# Patient Record
Sex: FEMALE | Race: WHITE | NOT HISPANIC OR LATINO | Employment: UNEMPLOYED | URBAN - METROPOLITAN AREA
[De-identification: names, ages, dates, MRNs, and addresses within clinical notes are randomized per-mention and may not be internally consistent; named-entity substitution may affect disease eponyms.]

---

## 2023-07-13 PROBLEM — Z98.891 HX OF CESAREAN SECTION: Status: ACTIVE | Noted: 2023-07-13

## 2023-07-25 ENCOUNTER — OFFICE VISIT (OUTPATIENT)
Dept: OBSTETRICS AND GYNECOLOGY | Facility: HOSPITAL | Age: 33
End: 2023-07-25
Payer: COMMERCIAL

## 2023-07-25 ENCOUNTER — HOSPITAL ENCOUNTER (OUTPATIENT)
Dept: WOMENS IMAGING | Facility: HOSPITAL | Age: 33
Discharge: HOME | End: 2023-07-25
Admitting: MIDWIFE
Payer: COMMERCIAL

## 2023-07-25 VITALS
WEIGHT: 236 LBS | SYSTOLIC BLOOD PRESSURE: 120 MMHG | HEIGHT: 65 IN | BODY MASS INDEX: 39.32 KG/M2 | DIASTOLIC BLOOD PRESSURE: 75 MMHG

## 2023-07-25 DIAGNOSIS — Z34.82 ENCOUNTER FOR SUPERVISION OF OTHER NORMAL PREGNANCY IN SECOND TRIMESTER: ICD-10-CM

## 2023-07-25 DIAGNOSIS — D56.3 CARRIER OF BETA THALASSEMIA: Primary | ICD-10-CM

## 2023-07-25 DIAGNOSIS — O34.219 PREVIOUS CESAREAN DELIVERY AFFECTING PREGNANCY: ICD-10-CM

## 2023-07-25 DIAGNOSIS — Z34.90 PREGNANCY, UNSPECIFIED GESTATIONAL AGE: ICD-10-CM

## 2023-07-25 PROCEDURE — 99212 OFFICE O/P EST SF 10 MIN: CPT | Performed by: OBSTETRICS & GYNECOLOGY

## 2023-07-25 PROCEDURE — 76811 OB US DETAILED SNGL FETUS: CPT

## 2023-07-25 NOTE — ASSESSMENT & PLAN NOTE
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  She has had no significant complications.  She reports no need for transfusions and has not reported any concerns regarding her hematocrit or hemoglobin.  The only symptom she notes is some cold intolerance.    Beta thalassemia is generally inherited as a autosomal recessive.  Father this child has not been tested but has no symptoms of thalassemia.  Recommended the father be tested for thalassemia.  If he is a carrier then this fetus has a 1 and forts risk of thalassemia major.  If he is not a carrier then the child has a 50-50 chance of being a carrier for thalassemia.    Patient is already tolerating 1 pregnancy without significant complications requiring no alterations with normal sed rate care.  I would recommend CBCs approximately every 2 months.  When the patient becomes anemic and iron studies would be appropriate and therapy as indicated.    We recommend the patient return in 6 weeks time for us to complete an anatomic survey and reassess fetal growth.

## 2023-07-25 NOTE — PROGRESS NOTES
"Documentation of the ultrasound findings, images, and interpretations will be available in the patient's Viewpoint report which is located in the imaging tab in chart review.        Maternal/Fetal Medicine Consult Note     Name: Sayda Tinsley    : 1990     MRN: [id number]     Referring Provider: Jodie Faulkner CNM    Chief Complaint  low lying placenta    Subjective     History of Present Illness:  Sayda Tinsley is a 33 y.o.  22w4d who presents today for beta thalassemia    GLO: Estimated Date of Delivery: 23     ROS:   As noted in HPI.     Past Medical History:   Diagnosis Date    Anemia     Anxiety     Asthma     Hypertension     PMS (premenstrual syndrome)       Past Surgical History:   Procedure Laterality Date     SECTION  21    EAR TUBES      WISDOM TOOTH EXTRACTION        OB History          3    Para   1    Term   1       0    AB   1    Living   1         SAB   0    IAB   1    Ectopic   0    Molar   0    Multiple   0    Live Births   1          Obstetric Comments   FOB #1 Pregnancy #1  IAB at 7 weeks  FOB #2 Pregnancy #2 P C/S at 38 weeks, female, GHTN  FOB #2 Pregnancy #3  current               Objective     Vital Signs  /75   Ht 165.1 cm (65\")   Wt 107 kg (236 lb)   Estimated body mass index is 39.27 kg/m² as calculated from the following:    Height as of this encounter: 165.1 cm (65\").    Weight as of this encounter: 107 kg (236 lb).    Physical Exam    Ultrasound Impression:   See viewpoint    Assessment and Plan     Sayda Tinsley is a 33 y.o.  22w4d who presents today for beta thalassemia.    Diagnoses and all orders for this visit:    1. Carrier of beta thalassemia (Primary)  Assessment & Plan:  Patient diagnosed with beta thalassemia minor as a child.  She has had no significant complications.  She reports no need for transfusions and has not reported any concerns regarding her hematocrit or hemoglobin.  The only symptom " she notes is some cold intolerance.    Beta thalassemia is generally inherited as a autosomal recessive.  Father this child has not been tested but has no symptoms of thalassemia.  Recommended the father be tested for thalassemia.  If he is a carrier then this fetus has a 1 and forts risk of thalassemia major.  If he is not a carrier then the child has a 50-50 chance of being a carrier for thalassemia.    Patient is already tolerating 1 pregnancy without significant complications requiring no alterations with normal sed rate care.  I would recommend CBCs approximately every 2 months.  When the patient becomes anemic and iron studies would be appropriate and therapy as indicated.    We recommend the patient return in 6 weeks time for us to complete an anatomic survey and reassess fetal growth.      2. Pregnancy, unspecified gestational age    3. Previous  delivery affecting pregnancy         Follow Up  Return in about 6 weeks (around 2023).    I spent 20 minutes caring for the patient on the day of service. This included: obtaining or reviewing a separately obtained medical history, reviewing patient records, performing a medically appropriate exam and/or evaluation, counseling or educating the patient/family/caregiver, ordering medications, labs, and/or procedures and documenting such in the medical record. This does not include time spent on review and interpretation of other tests such as fetal ultrasound or the performance of other procedures such as amniocentesis or CVS.      Douglas A. Milligan, MD  Maternal Fetal Medicine, [facility]     2023

## 2023-07-25 NOTE — LETTER
July 25, 2023     Jodie Faulkner CNM  [address] [facility]  [address] [place] [id number]    Patient: Sayda Tinsley   YOB: 1990   Date of Visit: 7/25/2023       Dear Jodie Faulkner CNM:    Thank you for referring Sayda Tinsley to me for evaluation. Below are the relevant portions of my assessment and plan of care.    Encounter Diagnosis and Orders:       If you have questions, please do not hesitate to call me. I look forward to following Sayda along with you.         Sincerely,        Douglas A. Milligan, MD        CC: No Recipients

## 2023-07-25 NOTE — PROGRESS NOTES
Patient denies bleeding, leaking fluid or cramping  NIPT low risk  Patients next follow up with Dr. Veliz office is 8/14/2023

## 2023-08-14 ENCOUNTER — ROUTINE PRENATAL (OUTPATIENT)
Dept: OBSTETRICS AND GYNECOLOGY | Facility: CLINIC | Age: 33
End: 2023-08-14
Payer: COMMERCIAL

## 2023-08-14 VITALS — SYSTOLIC BLOOD PRESSURE: 122 MMHG | BODY MASS INDEX: 40.1 KG/M2 | WEIGHT: 241 LBS | DIASTOLIC BLOOD PRESSURE: 68 MMHG

## 2023-08-14 DIAGNOSIS — Z98.891 HX OF CESAREAN SECTION: Primary | ICD-10-CM

## 2023-08-14 DIAGNOSIS — D56.3 CARRIER OF BETA THALASSEMIA: ICD-10-CM

## 2023-08-14 PROCEDURE — 0502F SUBSEQUENT PRENATAL CARE: CPT | Performed by: MIDWIFE

## 2023-08-14 NOTE — PROGRESS NOTES
Chief Complaint   Patient presents with    Routine Prenatal Visit     No Complaints/concerns        HPI: Sayda is a  currently at 25w3d here for prenatal visit who reports the following:  Baby is active. She has F/U with [facility] for heart views . She denies any problems.                EXAM:     Vitals:    23 1021   BP: 122/68      Abdomen:   See prenatal flowsheet as noted and reviewed, soft, nontender   Pelvic:  See prenatal flowsheet as noted and reviewed   Urine:  See prenatal flowsheet as noted and reviewed    Lab Results   Component Value Date    ABO O 2023    RH Positive 2023    ABSCRN Normal 2023       MDM:  Impression: Supervision of low risk pregnancy  Previous C/S with planned repeat C/S  + Thalassemia   Tests done today: UA for P/C ratio   Topics discussed: kick counts and fetal movement  low-lying placenta/previa precautions  Reviewed OB labs   Tests next visit: GCT  HgB                RTO:                        2 weeks    This note was electronically signed.  Jodie Faulkner, APRN  2023

## 2023-08-15 LAB
CREAT UR-MCNC: 47.4 MG/DL
PROT UR-MCNC: 6.3 MG/DL
PROT/CREAT UR: 132.9 MG/G CREA (ref 0–200)

## 2023-08-28 ENCOUNTER — ROUTINE PRENATAL (OUTPATIENT)
Dept: OBSTETRICS AND GYNECOLOGY | Facility: CLINIC | Age: 33
End: 2023-08-28
Payer: COMMERCIAL

## 2023-08-28 VITALS — WEIGHT: 241 LBS | SYSTOLIC BLOOD PRESSURE: 114 MMHG | BODY MASS INDEX: 40.1 KG/M2 | DIASTOLIC BLOOD PRESSURE: 72 MMHG

## 2023-08-28 DIAGNOSIS — D56.3 CARRIER OF BETA THALASSEMIA: ICD-10-CM

## 2023-08-28 DIAGNOSIS — Z98.891 HX OF CESAREAN SECTION: ICD-10-CM

## 2023-08-28 DIAGNOSIS — Z30.2 REQUEST FOR STERILIZATION: ICD-10-CM

## 2023-08-28 DIAGNOSIS — Z13.1 DIABETES MELLITUS SCREENING: ICD-10-CM

## 2023-08-28 DIAGNOSIS — Z34.92 PRENATAL CARE IN SECOND TRIMESTER: Primary | ICD-10-CM

## 2023-08-28 PROBLEM — O34.219 PREVIOUS CESAREAN DELIVERY AFFECTING PREGNANCY: Status: RESOLVED | Noted: 2023-07-25 | Resolved: 2023-08-28

## 2023-08-28 PROBLEM — Z34.90 PREGNANCY: Status: RESOLVED | Noted: 2023-07-25 | Resolved: 2023-08-28

## 2023-08-28 LAB
BASOPHILS # BLD AUTO: 0.03 10*3/MM3 (ref 0–0.2)
BASOPHILS NFR BLD AUTO: 0.3 % (ref 0–1.5)
EOSINOPHIL # BLD AUTO: 0.06 10*3/MM3 (ref 0–0.4)
EOSINOPHIL NFR BLD AUTO: 0.7 % (ref 0.3–6.2)
ERYTHROCYTE [DISTWIDTH] IN BLOOD BY AUTOMATED COUNT: 15.7 % (ref 12.3–15.4)
GLUCOSE 1H P 50 G GLC PO SERPL-MCNC: 110 MG/DL (ref 65–139)
HCT VFR BLD AUTO: 30.7 % (ref 34–46.6)
HGB BLD-MCNC: 9.9 G/DL (ref 12–15.9)
IMM GRANULOCYTES # BLD AUTO: 0.04 10*3/MM3 (ref 0–0.05)
IMM GRANULOCYTES NFR BLD AUTO: 0.5 % (ref 0–0.5)
LYMPHOCYTES # BLD AUTO: 1.68 10*3/MM3 (ref 0.7–3.1)
LYMPHOCYTES NFR BLD AUTO: 19.2 % (ref 19.6–45.3)
MCH RBC QN AUTO: 21.1 PG (ref 26.6–33)
MCHC RBC AUTO-ENTMCNC: 32.2 G/DL (ref 31.5–35.7)
MCV RBC AUTO: 65.5 FL (ref 79–97)
MONOCYTES # BLD AUTO: 0.44 10*3/MM3 (ref 0.1–0.9)
MONOCYTES NFR BLD AUTO: 5 % (ref 5–12)
NEUTROPHILS # BLD AUTO: 6.49 10*3/MM3 (ref 1.7–7)
NEUTROPHILS NFR BLD AUTO: 74.3 % (ref 42.7–76)
PLATELET # BLD AUTO: 198 10*3/MM3 (ref 140–450)
RBC # BLD AUTO: 4.69 10*6/MM3 (ref 3.77–5.28)
WBC # BLD AUTO: 8.74 10*3/MM3 (ref 3.4–10.8)

## 2023-08-28 PROCEDURE — 0502F SUBSEQUENT PRENATAL CARE: CPT | Performed by: OBSTETRICS & GYNECOLOGY

## 2023-08-28 NOTE — PROGRESS NOTES
Prenatal Care Visit    Subjective   Chief Complaint   Patient presents with    Routine Prenatal Visit     Mago, no complaints.       History:   Sayda is a  currently at 27w3d who presents for a prenatal care visit today.    No issues.    Social History    Tobacco Use      Smoking status: Former        Packs/day: 0.25        Years: 15.00        Pack years: 3.75        Types: Cigarettes        Quit date: 3/17/2023        Years since quittin.4      Smokeless tobacco: Not on file      Tobacco comments: Nor sure on start date       Objective   /72   Wt 109 kg (241 lb)   BMI 40.10 kg/mý   Physical Exam:  Normal, gestational age-appropriate exam today        Plan   Medical Decision Making:    I have reviewed the prenatal labs and ultrasound(s) today. I have reviewed the most recent prenatal progress note(s).    Diagnosis: Supervision of high risk pregnancy  Previous C/S with planned repeat C/S  Beta Thal minor, partner NEG  H/O gHTN  Desires sterilization   Tests/Orders/Rx today: Orders Placed This Encounter   Procedures    Gestational Screen 1 Hr ([facility])     Order Specific Question:   Release to patient     Answer:   Routine Release [[id number]]    CBC & Differential     Order Specific Question:   Manual Differential     Answer:   No     Order Specific Question:   Release to patient     Answer:   Routine Release [[id number]]       Medication Management: None     Topics discussed: Prenatal care milestones   section risks, benefits  tubal risks, benefits   Tests next visit: none   Next visit: 3 week(s)     Brody Chakraborty MD  Obstetrics and Gynecology  [facility]

## 2023-08-29 RX ORDER — FERROUS SULFATE 325(65) MG
325 TABLET ORAL
Qty: 60 TABLET | Refills: 6 | Status: SHIPPED | OUTPATIENT
Start: 2023-08-29

## 2023-09-05 ENCOUNTER — HOSPITAL ENCOUNTER (OUTPATIENT)
Dept: WOMENS IMAGING | Facility: HOSPITAL | Age: 33
Discharge: HOME | End: 2023-09-05
Admitting: OBSTETRICS & GYNECOLOGY
Payer: COMMERCIAL

## 2023-09-05 ENCOUNTER — OFFICE VISIT (OUTPATIENT)
Dept: OBSTETRICS AND GYNECOLOGY | Facility: HOSPITAL | Age: 33
End: 2023-09-05
Payer: COMMERCIAL

## 2023-09-05 VITALS — DIASTOLIC BLOOD PRESSURE: 62 MMHG | BODY MASS INDEX: 40.44 KG/M2 | SYSTOLIC BLOOD PRESSURE: 137 MMHG | WEIGHT: 243 LBS

## 2023-09-05 DIAGNOSIS — O34.219 PREVIOUS CESAREAN DELIVERY AFFECTING PREGNANCY: ICD-10-CM

## 2023-09-05 DIAGNOSIS — D56.3 CARRIER OF BETA THALASSEMIA: ICD-10-CM

## 2023-09-05 DIAGNOSIS — Z98.891 HX OF CESAREAN SECTION: ICD-10-CM

## 2023-09-05 DIAGNOSIS — Z34.90 PREGNANCY, UNSPECIFIED GESTATIONAL AGE: ICD-10-CM

## 2023-09-05 DIAGNOSIS — O44.40 LOW-LYING PLACENTA: Primary | ICD-10-CM

## 2023-09-05 PROCEDURE — 76816 OB US FOLLOW-UP PER FETUS: CPT

## 2023-09-05 NOTE — PROGRESS NOTES
Patient denies bleeding, leaking fluid or contractions  NIPT low risk  Patients next follow up YOLI Faulkner CNM is 9/11/2023

## 2023-09-11 ENCOUNTER — ROUTINE PRENATAL (OUTPATIENT)
Dept: OBSTETRICS AND GYNECOLOGY | Facility: CLINIC | Age: 33
End: 2023-09-11
Payer: COMMERCIAL

## 2023-09-11 VITALS — WEIGHT: 240 LBS | BODY MASS INDEX: 39.94 KG/M2 | DIASTOLIC BLOOD PRESSURE: 78 MMHG | SYSTOLIC BLOOD PRESSURE: 136 MMHG

## 2023-09-11 DIAGNOSIS — Z34.93 PRENATAL CARE IN THIRD TRIMESTER: Primary | ICD-10-CM

## 2023-09-11 DIAGNOSIS — D56.3 CARRIER OF BETA THALASSEMIA: ICD-10-CM

## 2023-09-11 DIAGNOSIS — O99.019 MATERNAL ANEMIA IN PREGNANCY, ANTEPARTUM: ICD-10-CM

## 2023-09-11 DIAGNOSIS — Z87.59 HISTORY OF GESTATIONAL HYPERTENSION: ICD-10-CM

## 2023-09-11 DIAGNOSIS — Z30.2 REQUEST FOR STERILIZATION: ICD-10-CM

## 2023-09-11 DIAGNOSIS — O34.219 PREVIOUS CESAREAN DELIVERY AFFECTING PREGNANCY: ICD-10-CM

## 2023-09-11 PROCEDURE — 0502F SUBSEQUENT PRENATAL CARE: CPT | Performed by: STUDENT IN AN ORGANIZED HEALTH CARE EDUCATION/TRAINING PROGRAM

## 2023-09-11 NOTE — PROGRESS NOTES
Prenatal Care Visit    Subjective   Chief Complaint   Patient presents with    Routine Prenatal Visit     History:   Sayda is a  currently at 29w3d who presents for a prenatal care visit today.    Denies CTX, VB, LOF. Reports (+) FM.     Objective   /78   Wt 109 kg (240 lb)   BMI 39.94 kg/m²   Physical Exam:  Normal, gestational age-appropriate exam today      Assessment & Plan     IUP @ 29w3d  Routine care: I have reviewed the prenatal labs and ultrasound(s) today. I have reviewed the most recent prenatal progress note(s).   Prior x 1: for failed IOL in setting of IOL due to gHTN, thrombocytopenia. Plans repeat CS with BTL.  Request for sterilization: FF signed 23  H/o gHTN: Developed elevated BP, thrombocytopenia. Denies requiring magnesium. Baseline pre-e labs wnl. bASA previously started, stopped due to normal BP.   Beta thalassemia trait: partner with negative testing  Anemia: iron supplement BID - discussed this may not improve her counts given her history of beta thalassemia trait, however recommend continuing to prevent superimposed iron deficiency anemia     Diagnosis Plan   1. Prenatal care in third trimester        2. Previous  delivery affecting pregnancy        3. Request for sterilization        4. History of gestational hypertension        5. Carrier of beta thalassemia        6. Maternal anemia in pregnancy, antepartum          Medication Management: Continue PNV, iron supplement    Topics discussed: Prenatal care milestones  Iron supplementation  Kick counts and fetal movement  Pre-eclampsia precautions   labor signs and symptoms   Tests next visit: U/S for EFW   Next visit: 3 week(s)     Sanaz Esquivel MD  Obstetrics and Gynecology  [facility]   Statement Selected

## 2023-10-02 ENCOUNTER — ROUTINE PRENATAL (OUTPATIENT)
Dept: OBSTETRICS AND GYNECOLOGY | Facility: CLINIC | Age: 33
End: 2023-10-02
Payer: COMMERCIAL

## 2023-10-02 VITALS — BODY MASS INDEX: 40.7 KG/M2 | WEIGHT: 244.6 LBS | SYSTOLIC BLOOD PRESSURE: 120 MMHG | DIASTOLIC BLOOD PRESSURE: 82 MMHG

## 2023-10-02 DIAGNOSIS — Z34.93 THIRD TRIMESTER PREGNANCY: Primary | ICD-10-CM

## 2023-10-02 PROBLEM — Z34.90 PREGNANCY: Status: ACTIVE | Noted: 2023-10-02

## 2023-10-02 PROCEDURE — 0502F SUBSEQUENT PRENATAL CARE: CPT | Performed by: OBSTETRICS & GYNECOLOGY

## 2023-10-02 RX ORDER — MISOPROSTOL 100 UG/1
800 TABLET ORAL AS NEEDED
OUTPATIENT
Start: 2023-10-02

## 2023-10-02 RX ORDER — SODIUM CHLORIDE 9 MG/ML
40 INJECTION, SOLUTION INTRAVENOUS AS NEEDED
OUTPATIENT
Start: 2023-10-02

## 2023-10-02 RX ORDER — LIDOCAINE HYDROCHLORIDE 10 MG/ML
0.5 INJECTION, SOLUTION INFILTRATION; PERINEURAL ONCE AS NEEDED
OUTPATIENT
Start: 2023-10-02

## 2023-10-02 RX ORDER — SODIUM CHLORIDE 0.9 % (FLUSH) 0.9 %
10 SYRINGE (ML) INJECTION AS NEEDED
OUTPATIENT
Start: 2023-10-02

## 2023-10-02 RX ORDER — METHYLERGONOVINE MALEATE 0.2 MG/ML
200 INJECTION INTRAVENOUS ONCE AS NEEDED
OUTPATIENT
Start: 2023-10-02

## 2023-10-02 RX ORDER — SODIUM CHLORIDE, SODIUM LACTATE, POTASSIUM CHLORIDE, CALCIUM CHLORIDE 600; 310; 30; 20 MG/100ML; MG/100ML; MG/100ML; MG/100ML
125 INJECTION, SOLUTION INTRAVENOUS CONTINUOUS
OUTPATIENT
Start: 2023-10-02

## 2023-10-02 RX ORDER — CITRIC ACID/SODIUM CITRATE 334-500MG
30 SOLUTION, ORAL ORAL ONCE
OUTPATIENT
Start: 2023-10-02 | End: 2023-10-02

## 2023-10-02 RX ORDER — CARBOPROST TROMETHAMINE 250 UG/ML
250 INJECTION, SOLUTION INTRAMUSCULAR AS NEEDED
OUTPATIENT
Start: 2023-10-02

## 2023-10-02 RX ORDER — KETOROLAC TROMETHAMINE 15 MG/ML
30 INJECTION, SOLUTION INTRAMUSCULAR; INTRAVENOUS ONCE
OUTPATIENT
Start: 2023-10-02 | End: 2023-10-02

## 2023-10-02 RX ORDER — SODIUM CHLORIDE 0.9 % (FLUSH) 0.9 %
10 SYRINGE (ML) INJECTION EVERY 12 HOURS SCHEDULED
OUTPATIENT
Start: 2023-10-02

## 2023-10-02 RX ORDER — OXYTOCIN/0.9 % SODIUM CHLORIDE 30/500 ML
42 PLASTIC BAG, INJECTION (ML) INTRAVENOUS AS NEEDED
OUTPATIENT
Start: 2023-10-02 | End: 2023-10-03

## 2023-10-02 RX ORDER — FAMOTIDINE 10 MG/ML
20 INJECTION, SOLUTION INTRAVENOUS ONCE
OUTPATIENT
Start: 2023-10-02 | End: 2023-10-02

## 2023-10-02 RX ORDER — OXYTOCIN/0.9 % SODIUM CHLORIDE 30/500 ML
333 PLASTIC BAG, INJECTION (ML) INTRAVENOUS ONCE
OUTPATIENT
Start: 2023-10-02

## 2023-10-02 RX ORDER — ACETAMINOPHEN 500 MG
1000 TABLET ORAL ONCE
OUTPATIENT
Start: 2023-10-02 | End: 2023-10-02

## 2023-10-03 PROBLEM — Z34.93 THIRD TRIMESTER PREGNANCY: Status: ACTIVE | Noted: 2023-10-03

## 2023-10-17 ENCOUNTER — ROUTINE PRENATAL (OUTPATIENT)
Dept: OBSTETRICS AND GYNECOLOGY | Facility: CLINIC | Age: 33
End: 2023-10-17
Payer: COMMERCIAL

## 2023-10-17 VITALS — WEIGHT: 247 LBS | DIASTOLIC BLOOD PRESSURE: 70 MMHG | SYSTOLIC BLOOD PRESSURE: 122 MMHG | BODY MASS INDEX: 41.1 KG/M2

## 2023-10-17 DIAGNOSIS — Z34.93 THIRD TRIMESTER PREGNANCY: ICD-10-CM

## 2023-10-17 DIAGNOSIS — Z98.891 HX OF CESAREAN SECTION: Primary | ICD-10-CM

## 2023-10-17 DIAGNOSIS — Z30.2 REQUEST FOR STERILIZATION: ICD-10-CM

## 2023-10-17 DIAGNOSIS — D56.3 CARRIER OF BETA THALASSEMIA: ICD-10-CM

## 2023-10-17 PROCEDURE — 0502F SUBSEQUENT PRENATAL CARE: CPT | Performed by: MIDWIFE

## 2023-10-17 NOTE — PROGRESS NOTES
Chief Complaint   Patient presents with    Routine Prenatal Visit     No Complaints/concerns        HPI: Sayda is a  currently at 34w4d here for prenatal visit who reports the following:  Baby is active. Repeat CSection is scheduled for . She denies any ctxs.                 EXAM:     Vitals:    10/17/23 0958   BP: 122/70      Abdomen:   See prenatal flowsheet as noted and reviewed, soft, nontender   Pelvic:  See prenatal flowsheet as noted and reviewed   Urine:  See prenatal flowsheet as noted and reviewed    Lab Results   Component Value Date    ABO O 2023    RH Positive 2023    ABSCRN Normal 2023       MDM:  Impression: Previous C/S with planned repeat C/S  Rh negative  Thalassemia  Anemia in pregnancy   Tests done today: none   Topics discussed: kick counts and fetal movement   labor signs and symptoms  Reviewed OB labs   Tests next visit: GBS testing  CBC                RTO:                        2 weeks    This note was electronically signed.  Jodie Faulkner, FÁTIMA  10/17/2023

## 2023-10-30 ENCOUNTER — ROUTINE PRENATAL (OUTPATIENT)
Dept: OBSTETRICS AND GYNECOLOGY | Facility: CLINIC | Age: 33
End: 2023-10-30
Payer: COMMERCIAL

## 2023-10-30 VITALS — BODY MASS INDEX: 41.04 KG/M2 | WEIGHT: 246.6 LBS | DIASTOLIC BLOOD PRESSURE: 70 MMHG | SYSTOLIC BLOOD PRESSURE: 128 MMHG

## 2023-10-30 DIAGNOSIS — Z36.85 ENCOUNTER FOR ANTENATAL SCREENING FOR STREPTOCOCCUS B: ICD-10-CM

## 2023-10-30 DIAGNOSIS — Z98.891 HX OF CESAREAN SECTION: Primary | ICD-10-CM

## 2023-10-30 NOTE — PROGRESS NOTES
Chief Complaint   Patient presents with    Routine Prenatal Visit     Prenatal visit with GBS done today. No Problems or concerns       HPI: Sayda is a  currently at 36w3d here for prenatal visit who reports the following:  Baby is active. She denies any ctxs. She had forceful vomiting last week and busted blood vessel in right eye.                EXAM:     Vitals:    10/30/23 1015   BP: 128/70      Abdomen:   See prenatal flowsheet as noted and reviewed, soft, nontender   Pelvic:  See prenatal flowsheet as noted and reviewed   Urine:  See prenatal flowsheet as noted and reviewed    Lab Results   Component Value Date    ABO O 2023    RH Positive 2023    ABSCRN Normal 2023       MDM:  Impression: Supervision of low risk pregnancy  Previous C/S with planned repeat C/S  Hx of GHTN  Anemia in pregnancy  + Thalessemia   Tests done today: GBS testing  CBC   Topics discussed: kick counts and fetal movement  labor signs and symptoms  Reviewed OB labs   Tests next visit: none                RTO:                        1 weeks    This note was electronically signed.  Jodie Faulkner, FÁTIMA  10/30/2023

## 2023-11-01 LAB — GP B STREP DNA SPEC QL NAA+PROBE: NEGATIVE

## 2023-11-07 ENCOUNTER — ROUTINE PRENATAL (OUTPATIENT)
Dept: OBSTETRICS AND GYNECOLOGY | Facility: CLINIC | Age: 33
End: 2023-11-07
Payer: COMMERCIAL

## 2023-11-07 VITALS — WEIGHT: 249 LBS | DIASTOLIC BLOOD PRESSURE: 64 MMHG | BODY MASS INDEX: 41.44 KG/M2 | SYSTOLIC BLOOD PRESSURE: 142 MMHG

## 2023-11-07 DIAGNOSIS — R03.0 ELEVATED BLOOD PRESSURE READING: ICD-10-CM

## 2023-11-07 DIAGNOSIS — Z98.891 HX OF CESAREAN SECTION: ICD-10-CM

## 2023-11-07 DIAGNOSIS — D56.3 CARRIER OF BETA THALASSEMIA: ICD-10-CM

## 2023-11-07 DIAGNOSIS — Z30.2 REQUEST FOR STERILIZATION: ICD-10-CM

## 2023-11-07 DIAGNOSIS — Z34.93 PRENATAL CARE IN THIRD TRIMESTER: Primary | ICD-10-CM

## 2023-11-07 PROCEDURE — 0502F SUBSEQUENT PRENATAL CARE: CPT | Performed by: OBSTETRICS & GYNECOLOGY

## 2023-11-07 NOTE — PROGRESS NOTES
Prenatal Care Visit    Subjective   Chief Complaint   Patient presents with    Routine Prenatal Visit     Leg swelling and pain       History:   Sayda is a  currently at 37w4d who presents for a prenatal care visit today.    No major issues.    Social History    Tobacco Use      Smoking status: Former        Packs/day: 0.25        Years: 15.00        Additional pack years: 0.00        Total pack years: 3.75        Types: Cigarettes        Quit date: 3/17/2023        Years since quittin.6      Smokeless tobacco: Not on file      Tobacco comments: Nor sure on start date       Objective   /64   Wt 113 kg (249 lb)   BMI 41.44 kg/m²   Physical Exam:  Normal, gestational age-appropriate exam today        Plan   Medical Decision Making:    I have reviewed the prenatal labs and ultrasound(s) today. I have reviewed the most recent prenatal progress note(s).    Diagnosis: Supervision of high risk pregnancy  Previous C/S with planned repeat C/S  Elevated blood pressure today  Beta Thal minor, partner NEG  H/O gHTN  Desires sterilization  BMI 41   Tests/Orders/Rx today: No orders of the defined types were placed in this encounter.      Medication Management: None     Topics discussed: Prenatal care milestones  kick counts and fetal movement  PIH precautions   labor signs and symptoms   Tests next visit: BP check this week   Next visit: This week     Brody Chakraborty MD  Obstetrics and Gynecology  [facility]

## 2023-11-09 ENCOUNTER — ROUTINE PRENATAL (OUTPATIENT)
Dept: OBSTETRICS AND GYNECOLOGY | Facility: CLINIC | Age: 33
End: 2023-11-09
Payer: COMMERCIAL

## 2023-11-09 ENCOUNTER — HOSPITAL ENCOUNTER (OUTPATIENT)
Facility: HOSPITAL | Age: 33
Discharge: HOME | End: 2023-11-09
Attending: MIDWIFE | Admitting: MIDWIFE
Payer: COMMERCIAL

## 2023-11-09 VITALS
TEMPERATURE: 97.9 F | WEIGHT: 249 LBS | OXYGEN SATURATION: 99 % | BODY MASS INDEX: 41.48 KG/M2 | RESPIRATION RATE: 18 BRPM | HEIGHT: 65 IN | SYSTOLIC BLOOD PRESSURE: 115 MMHG | DIASTOLIC BLOOD PRESSURE: 65 MMHG | HEART RATE: 77 BPM

## 2023-11-09 VITALS — DIASTOLIC BLOOD PRESSURE: 90 MMHG | WEIGHT: 249 LBS | BODY MASS INDEX: 41.44 KG/M2 | SYSTOLIC BLOOD PRESSURE: 138 MMHG

## 2023-11-09 DIAGNOSIS — Z34.93 THIRD TRIMESTER PREGNANCY: ICD-10-CM

## 2023-11-09 DIAGNOSIS — D56.3 CARRIER OF BETA THALASSEMIA: ICD-10-CM

## 2023-11-09 DIAGNOSIS — Z98.891 HX OF CESAREAN SECTION: Primary | ICD-10-CM

## 2023-11-09 LAB
ALBUMIN SERPL-MCNC: 3.6 G/DL (ref 3.5–5.2)
ALBUMIN/GLOB SERPL: 1.1 G/DL
ALP SERPL-CCNC: 151 U/L (ref 39–117)
ALT SERPL W P-5'-P-CCNC: 8 U/L (ref 1–33)
ANION GAP SERPL CALCULATED.3IONS-SCNC: 9.4 MMOL/L (ref 5–15)
ANISOCYTOSIS BLD QL: NORMAL
AST SERPL-CCNC: 19 U/L (ref 1–32)
BACTERIA UR QL AUTO: ABNORMAL /HPF
BASOPHILS # BLD AUTO: 0.01 10*3/MM3 (ref 0–0.2)
BASOPHILS NFR BLD AUTO: 0.1 % (ref 0–1.5)
BILIRUB SERPL-MCNC: 0.3 MG/DL (ref 0–1.2)
BILIRUB UR QL STRIP: NEGATIVE
BUN SERPL-MCNC: 6 MG/DL (ref 6–20)
BUN/CREAT SERPL: 11.1 (ref 7–25)
CALCIUM SPEC-SCNC: 9.3 MG/DL (ref 8.6–10.5)
CHLORIDE SERPL-SCNC: 103 MMOL/L (ref 98–107)
CLARITY UR: CLEAR
CO2 SERPL-SCNC: 23.6 MMOL/L (ref 22–29)
COLOR UR: YELLOW
CREAT SERPL-MCNC: 0.54 MG/DL (ref 0.57–1)
CREAT UR-MCNC: 57.7 MG/DL
DEPRECATED RDW RBC AUTO: 38.8 FL (ref 37–54)
EGFRCR SERPLBLD CKD-EPI 2021: 126.4 ML/MIN/1.73
EOSINOPHIL # BLD AUTO: 0.07 10*3/MM3 (ref 0–0.4)
EOSINOPHIL NFR BLD AUTO: 0.7 % (ref 0.3–6.2)
ERYTHROCYTE [DISTWIDTH] IN BLOOD BY AUTOMATED COUNT: 17.3 % (ref 12.3–15.4)
GLOBULIN UR ELPH-MCNC: 3.3 GM/DL
GLUCOSE SERPL-MCNC: 83 MG/DL (ref 65–99)
GLUCOSE UR STRIP-MCNC: NEGATIVE MG/DL
HCT VFR BLD AUTO: 27.9 % (ref 34–46.6)
HGB BLD-MCNC: 8.6 G/DL (ref 12–15.9)
HGB UR QL STRIP.AUTO: NEGATIVE
HYALINE CASTS UR QL AUTO: ABNORMAL /LPF
HYPOCHROMIA BLD QL: NORMAL
IMM GRANULOCYTES # BLD AUTO: 0.04 10*3/MM3 (ref 0–0.05)
IMM GRANULOCYTES NFR BLD AUTO: 0.4 % (ref 0–0.5)
KETONES UR QL STRIP: NEGATIVE
LARGE PLATELETS: NORMAL
LEUKOCYTE ESTERASE UR QL STRIP.AUTO: ABNORMAL
LYMPHOCYTES # BLD AUTO: 1.97 10*3/MM3 (ref 0.7–3.1)
LYMPHOCYTES NFR BLD AUTO: 18.9 % (ref 19.6–45.3)
MCH RBC QN AUTO: 20.4 PG (ref 26.6–33)
MCHC RBC AUTO-ENTMCNC: 30.8 G/DL (ref 31.5–35.7)
MCV RBC AUTO: 66.3 FL (ref 79–97)
MICROCYTES BLD QL: NORMAL
MONOCYTES # BLD AUTO: 0.71 10*3/MM3 (ref 0.1–0.9)
MONOCYTES NFR BLD AUTO: 6.8 % (ref 5–12)
NEUTROPHILS NFR BLD AUTO: 7.6 10*3/MM3 (ref 1.7–7)
NEUTROPHILS NFR BLD AUTO: 73.1 % (ref 42.7–76)
NITRITE UR QL STRIP: NEGATIVE
NRBC BLD AUTO-RTO: 0 /100 WBC (ref 0–0.2)
PH UR STRIP.AUTO: 7.5 [PH] (ref 5–8)
PLATELET # BLD AUTO: 181 10*3/MM3 (ref 140–450)
POTASSIUM SERPL-SCNC: 3.8 MMOL/L (ref 3.5–5.2)
PROT ?TM UR-MCNC: 5 MG/DL
PROT SERPL-MCNC: 6.9 G/DL (ref 6–8.5)
PROT UR QL STRIP: NEGATIVE
PROT/CREAT UR: 86.7 MG/G CREA (ref 0–200)
RBC # BLD AUTO: 4.21 10*6/MM3 (ref 3.77–5.28)
RBC # UR STRIP: ABNORMAL /HPF
REF LAB TEST METHOD: ABNORMAL
SMALL PLATELETS BLD QL SMEAR: ADEQUATE
SODIUM SERPL-SCNC: 136 MMOL/L (ref 136–145)
SP GR UR STRIP: 1.01 (ref 1–1.03)
SQUAMOUS #/AREA URNS HPF: ABNORMAL /HPF
UROBILINOGEN UR QL STRIP: ABNORMAL
WBC # UR STRIP: ABNORMAL /HPF
WBC MORPH BLD: NORMAL
WBC NRBC COR # BLD: 10.4 10*3/MM3 (ref 3.4–10.8)

## 2023-11-09 PROCEDURE — 90686 IIV4 VACC NO PRSV 0.5 ML IM: CPT | Performed by: MIDWIFE

## 2023-11-09 PROCEDURE — 81001 URINALYSIS AUTO W/SCOPE: CPT | Performed by: MIDWIFE

## 2023-11-09 PROCEDURE — 36415 COLL VENOUS BLD VENIPUNCTURE: CPT | Performed by: MIDWIFE

## 2023-11-09 PROCEDURE — 80053 COMPREHEN METABOLIC PANEL: CPT | Performed by: MIDWIFE

## 2023-11-09 PROCEDURE — G0463 HOSPITAL OUTPT CLINIC VISIT: HCPCS

## 2023-11-09 PROCEDURE — 59025 FETAL NON-STRESS TEST: CPT | Performed by: MIDWIFE

## 2023-11-09 PROCEDURE — G0008 ADMIN INFLUENZA VIRUS VAC: HCPCS | Performed by: MIDWIFE

## 2023-11-09 PROCEDURE — 85007 BL SMEAR W/DIFF WBC COUNT: CPT | Performed by: MIDWIFE

## 2023-11-09 PROCEDURE — 59025 FETAL NON-STRESS TEST: CPT

## 2023-11-09 PROCEDURE — 25010000002 INFLUENZA VAC SPLIT QUAD 0.5 ML SUSPENSION PREFILLED SYRINGE: Performed by: MIDWIFE

## 2023-11-09 PROCEDURE — 87086 URINE CULTURE/COLONY COUNT: CPT | Performed by: MIDWIFE

## 2023-11-09 PROCEDURE — 85025 COMPLETE CBC W/AUTO DIFF WBC: CPT | Performed by: MIDWIFE

## 2023-11-09 PROCEDURE — 82570 ASSAY OF URINE CREATININE: CPT | Performed by: MIDWIFE

## 2023-11-09 PROCEDURE — 84156 ASSAY OF PROTEIN URINE: CPT | Performed by: MIDWIFE

## 2023-11-09 RX ADMIN — INFLUENZA A VIRUS A/VICTORIA/4897/2022 IVR-238 (H1N1) ANTIGEN (FORMALDEHYDE INACTIVATED), INFLUENZA A VIRUS A/DARWIN/9/2021 SAN-010 (H3N2) ANTIGEN (FORMALDEHYDE INACTIVATED), INFLUENZA B VIRUS B/PHUKET/3073/2013 ANTIGEN (FORMALDEHYDE INACTIVATED), AND INFLUENZA B VIRUS B/MICHIGAN/01/2021 ANTIGEN (FORMALDEHYDE INACTIVATED) 0.5 ML: 15; 15; 15; 15 INJECTION, SUSPENSION INTRAMUSCULAR at 13:40

## 2023-11-09 NOTE — PROGRESS NOTES
Chief Complaint   Patient presents with    Routine Prenatal Visit     BP Check       HPI: Sayda is a  currently at 37w6d here for prenatal visit who reports the following: She is here today for repeat BP check. She denies headaches, visual changes or epigastric pain. She is having some pain and swelling in her legs. C section is scheduled for                 EXAM:     Vitals:    23 1204   BP: 138/90      Abdomen:   See prenatal flowsheet as noted and reviewed, soft, nontender   Pelvic:  See prenatal flowsheet as noted and reviewed   Urine:  See prenatal flowsheet as noted and reviewed    Lab Results   Component Value Date    ABO O 2023    RH Positive 2023    ABSCRN Normal 2023       MDM:  Impression: Previous C/S with planned repeat C/S  Elevated blood pressure  Beta Thalassemia carrier  Anemia   Tests done today: none   Topics discussed: kick counts and fetal movement  labor signs and symptoms  PIH precautions  To LH for PIH labs, NST and serial BPs  Reviewed OB labs   Tests next visit: none                RTO:                         Monday    This note was electronically signed.  Jodie Faulkner, APRN  2023

## 2023-11-09 NOTE — NON STRESS TEST
Triage Note - Nursing Documentation  Labor and Delivery Admission Log    Sayda Tinsley  : 1990  MRN: [id number]  CSN: [id number]    Date in / Time in:  2023  Time in: 1216    Date out / Time out:    Time out: 1350    Nurse: Tatiana Raines, RN    Patient Info: She is a 33 y.o. year old  at 37w6d with an GLO of 2023, by Ultrasound who was seen on the [facility].    Chief Complaint:   Chief Complaint   Patient presents with    Non-stress Test     Sent from office for elevated blood pressures       Provider Instructions / Disposition: PO hydration, labs done, VS reviewed, DC home FU in office as scheduled. S&S to return discussed.     Patient Active Problem List   Diagnosis    Hx of  section    Carrier of beta thalassemia    Request for sterilization    Pregnancy    Third trimester pregnancy       NST Documentation (Only applicable > 32 weeks): Interpretation A  Nonstress Test Interpretation A: Reactive (23 1339 : Tatiana Raines, RN)

## 2023-11-10 LAB — BACTERIA SPEC AEROBE CULT: NO GROWTH

## 2023-11-13 ENCOUNTER — ROUTINE PRENATAL (OUTPATIENT)
Dept: OBSTETRICS AND GYNECOLOGY | Facility: CLINIC | Age: 33
End: 2023-11-13
Payer: COMMERCIAL

## 2023-11-13 VITALS — WEIGHT: 249 LBS | SYSTOLIC BLOOD PRESSURE: 142 MMHG | BODY MASS INDEX: 41.44 KG/M2 | DIASTOLIC BLOOD PRESSURE: 78 MMHG

## 2023-11-13 DIAGNOSIS — O99.019 MATERNAL ANEMIA IN PREGNANCY, ANTEPARTUM: ICD-10-CM

## 2023-11-13 DIAGNOSIS — O09.93 ENCOUNTER FOR SUPERVISION OF HIGH RISK PREGNANCY IN THIRD TRIMESTER, ANTEPARTUM: Primary | ICD-10-CM

## 2023-11-13 DIAGNOSIS — Z87.59 HISTORY OF GESTATIONAL HYPERTENSION: ICD-10-CM

## 2023-11-13 DIAGNOSIS — D56.3 CARRIER OF BETA THALASSEMIA: ICD-10-CM

## 2023-11-13 DIAGNOSIS — Z98.891 HX OF CESAREAN SECTION: ICD-10-CM

## 2023-11-13 DIAGNOSIS — Z30.2 REQUEST FOR STERILIZATION: ICD-10-CM

## 2023-11-13 PROCEDURE — 0502F SUBSEQUENT PRENATAL CARE: CPT | Performed by: OBSTETRICS & GYNECOLOGY

## 2023-11-13 NOTE — PROGRESS NOTES
Chief Complaint  Routine Prenatal Visit    History of Present Illness:  Sayda is a  currently at 38w3d who presents today with no significant complaints.  Patient denies any significant cramping or contractions.  Patient did have previous elevated blood pressures as well.  She was sent to labor and delivery.  She did have labs as noted.  Patient does have known beta thalassemia.  Patient is anemic as noted.  She has been taking her prenatal vitamins and iron supplements.  Patient has her repeat  section with tubal ligation scheduled for this Friday.  She denies any headaches or visual disturbances.    Exam:  Vitals:  See prenatal flowsheet as noted and reviewed  General: Alert, cooperative, and does not appear in any distress  Abdomen:   See prenatal flowsheet as noted and reviewed    Uterus gravid, non-tender; no palpable masses    No guarding or rebound tenderness  Pelvic:  See prenatal flowsheet as noted and reviewed  Ext:  See prenatal flowsheet as noted and reviewed    Moves extremities well, no cyanosis and no redness  Urine:  See prenatal flowsheet as noted and reviewed    Data Review:  The following data was reviewed by: Flaquita Torres MD on 2023:  Prenatal Labs:  Lab Results   Component Value Date    HGB 8.6 (L) 2023    RUBELLAABIGG 63.8 2023    HEPBSAG Negative 2023    ABO O 2023    RH Positive 2023    ABSCRN Normal 2023    XPL9BQP9 negative 2023    HEPCVIRUSABY negative 2023    STREPGPB Negative 10/30/2023    URINECX No growth 2023       Admission on 2023, Discharged on 2023   Component Date Value    Glucose 2023 83     BUN 2023 6     Creatinine 2023 0.54 (L)     Sodium 2023 136     Potassium 2023 3.8     Chloride 2023 103     CO2 2023 23.6     Calcium 2023 9.3     Total Protein 2023 6.9     Albumin 2023 3.6     ALT (SGPT) 2023 8     AST (SGOT) 2023  19     Alkaline Phosphatase 11/09/2023 151 (H)     Total Bilirubin 11/09/2023 0.3     Globulin 11/09/2023 3.3     A/G Ratio 11/09/2023 1.1     BUN/Creatinine Ratio 11/09/2023 11.1     Anion Gap 11/09/2023 9.4     eGFR 11/09/2023 126.4     Protein/Creatinine Ratio* 11/09/2023 86.7     Creatinine, Urine 11/09/2023 57.7     Total Protein, Urine 11/09/2023 5.0     WBC 11/09/2023 10.40     RBC 11/09/2023 4.21     Hemoglobin 11/09/2023 8.6 (L)     Hematocrit 11/09/2023 27.9 (L)     MCV 11/09/2023 66.3 (L)     MCH 11/09/2023 20.4 (L)     MCHC 11/09/2023 30.8 (L)     RDW 11/09/2023 17.3 (H)     RDW-SD 11/09/2023 38.8     Platelets 11/09/2023 181     Neutrophil % 11/09/2023 73.1     Lymphocyte % 11/09/2023 18.9 (L)     Monocyte % 11/09/2023 6.8     Eosinophil % 11/09/2023 0.7     Basophil % 11/09/2023 0.1     Immature Grans % 11/09/2023 0.4     Neutrophils, Absolute 11/09/2023 7.60 (H)     Lymphocytes, Absolute 11/09/2023 1.97     Monocytes, Absolute 11/09/2023 0.71     Eosinophils, Absolute 11/09/2023 0.07     Basophils, Absolute 11/09/2023 0.01     Immature Grans, Absolute 11/09/2023 0.04     nRBC 11/09/2023 0.0     Color, UA 11/09/2023 Yellow     Appearance, UA 11/09/2023 Clear     pH, UA 11/09/2023 7.5     Specific [place], UA 11/09/2023 1.008     Glucose, UA 11/09/2023 Negative     Ketones, UA 11/09/2023 Negative     Bilirubin, UA 11/09/2023 Negative     Blood, UA 11/09/2023 Negative     Protein, UA 11/09/2023 Negative     Leuk Esterase, UA 11/09/2023 Trace (A)     Nitrite, UA 11/09/2023 Negative     Urobilinogen, UA 11/09/2023 0.2 E.U./dL     Anisocytosis 11/09/2023 Slight/1+     Hypochromia 11/09/2023 Slight/1+     Microcytes 11/09/2023 Mod/2+     WBC Morphology 11/09/2023 Normal     Platelet Estimate 11/09/2023 Adequate     Large Platelets 11/09/2023 Slight/1+     RBC, UA 11/09/2023 None Seen     WBC, UA 11/09/2023 0-2     Bacteria, UA 11/09/2023 Trace (A)     Squamous Epithelial Cell* 11/09/2023 7-12 (A)     Hyaline  Casts, UA 2023 None Seen     Methodology 2023 Manual Light Microscopy     Urine Culture 2023 No growth    Routine Prenatal on 10/30/2023   Component Date Value    Strep Gp B BEVERLEY 10/30/2023 Negative    Results Encounter on 10/18/2023   Component Date Value    WBC 10/30/2023 9.91     RBC 10/30/2023 4.36     Hemoglobin 10/30/2023 8.9 (L)     Hematocrit 10/30/2023 28.1 (L)     MCV 10/30/2023 64.4 (L)     MCH 10/30/2023 20.4 (L)     MCHC 10/30/2023 31.7     RDW 10/30/2023 17.3 (H)     Platelets 10/30/2023 204     Neutrophil Rel % 10/30/2023 85.6 (H)     Lymphocyte Rel % 10/30/2023 11.3 (L)     Monocyte Rel % 10/30/2023 3.1 (L)     Neutrophils Absolute 10/30/2023 8.48 (H)     Lymphocytes Absolute 10/30/2023 1.12     Monocytes Absolute 10/30/2023 0.31     Differential Comment 10/30/2023 Comment     Comment 10/30/2023 Comment     Plt Comment 10/30/2023 Comment      Imaging:  US Ob Follow Up Transabdominal Approach  Overall growth 65.2 percentile. FELIX 17.16. Vertex. Posterior placenta. 4   pounds 12 ounces. Active fetus      Medical Records:  None    Assessment and Plan:  Problem List Items Addressed This Visit          Chromosomal and Congenital     Carrier of beta thalassemia       Genitourinary and Reproductive     Request for sterilization       Gravid and     Hx of  section  Patient has repeat  section with tubal ligation scheduled.  Preop today as discussed.  Instructions and precautions have been given.  The risk, complications, benefits, as well as other alternatives have been discussed.     Other Visit Diagnoses       Encounter for supervision of high risk pregnancy in third trimester, antepartum    -  Primary  Topics discussed:      section risks, benefits  iron supplementation  kick counts and fetal movement  labor signs and symptoms  PIH precautions -patient is to continue monitoring her blood pressure.  PIH precautions and instructions have been given.  Patient  is to go to labor and delivery if changes in her symptoms as discussed      Maternal anemia in pregnancy, antepartum      Patient is to continue her iron supplements as noted.    History of gestational hypertension              Follow Up/Instructions:  Follow up as scheduled.  Patient was given instructions and counseling regarding her condition or for health maintenance advice. Please see specific information pulled into the AVS if appropriate.     Note: Speech recognition transcription software may have been used to dictate portions of this document.  An attempt at proofreading has been made though minor errors in transcription may still be present.    This note was electronically signed.  Flaquita Torres M.D.

## 2023-11-16 NOTE — H&P (VIEW-ONLY)
Don Tinsley  : 1990  MRN: [id number]  CSN: [id number]    History and Physical    Subjective   Sayda Tinsley is a 33 y.o. year old  who present for surgery due to intrauterine pregnancy at 39+ weeks, prior  x1, desired permanent sterilization.    Past Medical History:   Diagnosis Date    Anemia     Anxiety     Asthma     Hypertension     PMS (premenstrual syndrome)     Pregnancy 10/02/2023     Past Surgical History:   Procedure Laterality Date     SECTION  21    EAR TUBES      WISDOM TOOTH EXTRACTION       Social History    Tobacco Use      Smoking status: Former        Packs/day: 0.25        Years: 15.00        Additional pack years: 0.00        Total pack years: 3.75        Types: Cigarettes        Quit date: 3/17/2023        Years since quittin.6      Smokeless tobacco: Not on file      Tobacco comments: Not sure on start date    No current facility-administered medications for this encounter.    Current Outpatient Medications:     ferrous sulfate 325 (65 FE) MG tablet, Take 1 tablet by mouth 2 (Two) Times a Day Before Meals., Disp: 60 tablet, Rfl: 6    multivitamin with minerals tablet tablet, Take 1 tablet by mouth Daily., Disp: , Rfl:     Vitamin D, Cholecalciferol, 25 MCG (1000 UT) capsule, Take  by mouth., Disp: , Rfl:     Allergies   Allergen Reactions    Penicillins Hives       Review of Systems   Constitutional: Negative.    HENT: Negative.     Respiratory: Negative.     Cardiovascular: Negative.          Objective   There were no vitals taken for this visit.  General: well developed; well nourished  no acute distress   Heart: regular rate and rhythm, S1, S2 normal, no murmur, click, rub or gallop   Lungs: breathing is unlabored  clear to auscultation bilaterally   Abdomen: soft, non-tender; no masses  no umbilical or inguinal hernias are present  no hepato-splenomegaly   Pelvis:: External genitalia:  normal appearance of the external  genitalia including Bartholin's and [place]'s glands.   Labs  Lab Results   Component Value Date     2023    HGB 8.6 (L) 2023    HCT 27.9 (L) 2023    WBC 10.40 2023     2023    K 3.8 2023     2023    CO2 23.6 2023    BUN 6 2023    CREATININE 0.54 (L) 2023    GLUCOSE 83 2023    ALBUMIN 3.6 2023    CALCIUM 9.3 2023    AST 19 2023    ALT 8 2023    BILITOT 0.3 2023        Assessment   Intrauterine pregnancy at 39+ weeks  Prior  section x1  Desires permanent sterilization  Beta thalassemia minor-partner tested negative     Plan   R C/S, BTL   2. Risks, complications, benefits, and other alternatives discussed.    Dagoberto Castillo MD  2023  16:12 EST

## 2023-11-17 ENCOUNTER — HOSPITAL ENCOUNTER (INPATIENT)
Facility: HOSPITAL | Age: 33
LOS: 2 days | Discharge: HOME | End: 2023-11-19
Attending: OBSTETRICS & GYNECOLOGY | Admitting: OBSTETRICS & GYNECOLOGY
Payer: COMMERCIAL

## 2023-11-17 ENCOUNTER — ANESTHESIA (OUTPATIENT)
Dept: PERIOP | Facility: HOSPITAL | Age: 33
End: 2023-11-17
Payer: COMMERCIAL

## 2023-11-17 ENCOUNTER — ANESTHESIA EVENT (OUTPATIENT)
Dept: PERIOP | Facility: HOSPITAL | Age: 33
End: 2023-11-17
Payer: COMMERCIAL

## 2023-11-17 DIAGNOSIS — D56.3 CARRIER OF BETA THALASSEMIA: ICD-10-CM

## 2023-11-17 DIAGNOSIS — Z30.2 REQUEST FOR STERILIZATION: ICD-10-CM

## 2023-11-17 DIAGNOSIS — Z98.891 S/P C-SECTION: Primary | ICD-10-CM

## 2023-11-17 DIAGNOSIS — Z34.93 THIRD TRIMESTER PREGNANCY: ICD-10-CM

## 2023-11-17 LAB
ABO GROUP BLD: NORMAL
ABO GROUP BLD: NORMAL
ANISOCYTOSIS BLD QL: NORMAL
BASOPHILS # BLD AUTO: 0.03 10*3/MM3 (ref 0–0.2)
BASOPHILS NFR BLD AUTO: 0.3 % (ref 0–1.5)
BLD GP AB SCN SERPL QL: NEGATIVE
DEPRECATED RDW RBC AUTO: 38.4 FL (ref 37–54)
EOSINOPHIL # BLD AUTO: 0.06 10*3/MM3 (ref 0–0.4)
EOSINOPHIL NFR BLD AUTO: 0.6 % (ref 0.3–6.2)
ERYTHROCYTE [DISTWIDTH] IN BLOOD BY AUTOMATED COUNT: 17.5 % (ref 12.3–15.4)
HCT VFR BLD AUTO: 27.5 % (ref 34–46.6)
HGB BLD-MCNC: 8.5 G/DL (ref 12–15.9)
IMM GRANULOCYTES # BLD AUTO: 0.03 10*3/MM3 (ref 0–0.05)
IMM GRANULOCYTES NFR BLD AUTO: 0.3 % (ref 0–0.5)
LYMPHOCYTES # BLD AUTO: 2.36 10*3/MM3 (ref 0.7–3.1)
LYMPHOCYTES NFR BLD AUTO: 23.5 % (ref 19.6–45.3)
MCH RBC QN AUTO: 20 PG (ref 26.6–33)
MCHC RBC AUTO-ENTMCNC: 30.9 G/DL (ref 31.5–35.7)
MCV RBC AUTO: 64.9 FL (ref 79–97)
MICROCYTES BLD QL: NORMAL
MONOCYTES # BLD AUTO: 0.62 10*3/MM3 (ref 0.1–0.9)
MONOCYTES NFR BLD AUTO: 6.2 % (ref 5–12)
NEUTROPHILS NFR BLD AUTO: 6.94 10*3/MM3 (ref 1.7–7)
NEUTROPHILS NFR BLD AUTO: 69.1 % (ref 42.7–76)
NRBC BLD AUTO-RTO: 0 /100 WBC (ref 0–0.2)
PLATELET # BLD AUTO: 190 10*3/MM3 (ref 140–450)
RBC # BLD AUTO: 4.24 10*6/MM3 (ref 3.77–5.28)
RH BLD: POSITIVE
RH BLD: POSITIVE
SMALL PLATELETS BLD QL SMEAR: ADEQUATE
T&S EXPIRATION DATE: NORMAL
WBC MORPH BLD: NORMAL
WBC NRBC COR # BLD AUTO: 10.04 10*3/MM3 (ref 3.4–10.8)

## 2023-11-17 PROCEDURE — 86901 BLOOD TYPING SEROLOGIC RH(D): CPT | Performed by: OBSTETRICS & GYNECOLOGY

## 2023-11-17 PROCEDURE — 86901 BLOOD TYPING SEROLOGIC RH(D): CPT

## 2023-11-17 PROCEDURE — 25010000002 BUPIVACAINE PF 0.75 % SOLUTION: Performed by: NURSE ANESTHETIST, CERTIFIED REGISTERED

## 2023-11-17 PROCEDURE — 0UL70CZ OCCLUSION OF BILATERAL FALLOPIAN TUBES WITH EXTRALUMINAL DEVICE, OPEN APPROACH: ICD-10-PCS | Performed by: OBSTETRICS & GYNECOLOGY

## 2023-11-17 PROCEDURE — 86850 RBC ANTIBODY SCREEN: CPT | Performed by: OBSTETRICS & GYNECOLOGY

## 2023-11-17 PROCEDURE — 86900 BLOOD TYPING SEROLOGIC ABO: CPT | Performed by: OBSTETRICS & GYNECOLOGY

## 2023-11-17 PROCEDURE — 58611 LIGATE OVIDUCT(S) ADD-ON: CPT | Performed by: OBSTETRICS & GYNECOLOGY

## 2023-11-17 PROCEDURE — 25010000002 ONDANSETRON PER 1 MG: Performed by: NURSE ANESTHETIST, CERTIFIED REGISTERED

## 2023-11-17 PROCEDURE — 25010000002 MORPHINE PER 10 MG: Performed by: NURSE ANESTHETIST, CERTIFIED REGISTERED

## 2023-11-17 PROCEDURE — 86900 BLOOD TYPING SEROLOGIC ABO: CPT

## 2023-11-17 PROCEDURE — 25010000002 OXYTOCIN PER 10 UNITS: Performed by: NURSE ANESTHETIST, CERTIFIED REGISTERED

## 2023-11-17 PROCEDURE — 59515 CESAREAN DELIVERY: CPT | Performed by: OBSTETRICS & GYNECOLOGY

## 2023-11-17 PROCEDURE — 25010000002 CEFAZOLIN SODIUM-DEXTROSE 2-3 GM-%(50ML) RECONSTITUTED SOLUTION: Performed by: OBSTETRICS & GYNECOLOGY

## 2023-11-17 PROCEDURE — 25010000002 MIDAZOLAM PER 1MG: Performed by: NURSE ANESTHETIST, CERTIFIED REGISTERED

## 2023-11-17 PROCEDURE — 85007 BL SMEAR W/DIFF WBC COUNT: CPT | Performed by: OBSTETRICS & GYNECOLOGY

## 2023-11-17 PROCEDURE — 25810000003 LACTATED RINGERS PER 1000 ML: Performed by: NURSE ANESTHETIST, CERTIFIED REGISTERED

## 2023-11-17 PROCEDURE — 25810000003 LACTATED RINGERS PER 1000 ML: Performed by: OBSTETRICS & GYNECOLOGY

## 2023-11-17 PROCEDURE — 25010000002 ONDANSETRON PER 1 MG: Performed by: OBSTETRICS & GYNECOLOGY

## 2023-11-17 PROCEDURE — 25810000003 LACTATED RINGERS SOLUTION: Performed by: OBSTETRICS & GYNECOLOGY

## 2023-11-17 PROCEDURE — 86920 COMPATIBILITY TEST SPIN: CPT

## 2023-11-17 PROCEDURE — 85025 COMPLETE CBC W/AUTO DIFF WBC: CPT | Performed by: OBSTETRICS & GYNECOLOGY

## 2023-11-17 PROCEDURE — 25010000002 PROMETHAZINE PER 50 MG: Performed by: OBSTETRICS & GYNECOLOGY

## 2023-11-17 DEVICE — CLIP FALLOP FILSHIE TI PR STRL BX/20: Type: IMPLANTABLE DEVICE | Site: FALLOPIAN TUBE | Status: FUNCTIONAL

## 2023-11-17 RX ORDER — SODIUM CHLORIDE 0.9 % (FLUSH) 0.9 %
10 SYRINGE (ML) INJECTION AS NEEDED
Status: DISCONTINUED | OUTPATIENT
Start: 2023-11-17 | End: 2023-11-17 | Stop reason: HOSPADM

## 2023-11-17 RX ORDER — NALOXONE HCL 0.4 MG/ML
0.4 VIAL (ML) INJECTION
Status: DISCONTINUED | OUTPATIENT
Start: 2023-11-17 | End: 2023-11-19 | Stop reason: HOSPADM

## 2023-11-17 RX ORDER — MORPHINE SULFATE 1 MG/ML
INJECTION, SOLUTION EPIDURAL; INTRATHECAL; INTRAVENOUS AS NEEDED
Status: DISCONTINUED | OUTPATIENT
Start: 2023-11-17 | End: 2023-11-17 | Stop reason: SURG

## 2023-11-17 RX ORDER — ONDANSETRON 2 MG/ML
4 INJECTION INTRAMUSCULAR; INTRAVENOUS EVERY 6 HOURS PRN
Status: DISCONTINUED | OUTPATIENT
Start: 2023-11-17 | End: 2023-11-19 | Stop reason: HOSPADM

## 2023-11-17 RX ORDER — SIMETHICONE 80 MG
80 TABLET,CHEWABLE ORAL 4 TIMES DAILY PRN
Status: DISCONTINUED | OUTPATIENT
Start: 2023-11-17 | End: 2023-11-19 | Stop reason: HOSPADM

## 2023-11-17 RX ORDER — ACETAMINOPHEN 325 MG/1
650 TABLET ORAL EVERY 6 HOURS
Status: DISCONTINUED | OUTPATIENT
Start: 2023-11-18 | End: 2023-11-18 | Stop reason: DRUGHIGH

## 2023-11-17 RX ORDER — OXYTOCIN/0.9 % SODIUM CHLORIDE 30/500 ML
42 PLASTIC BAG, INJECTION (ML) INTRAVENOUS AS NEEDED
Status: DISCONTINUED | OUTPATIENT
Start: 2023-11-17 | End: 2023-11-17 | Stop reason: HOSPADM

## 2023-11-17 RX ORDER — MISOPROSTOL 200 UG/1
800 TABLET ORAL AS NEEDED
Status: DISCONTINUED | OUTPATIENT
Start: 2023-11-17 | End: 2023-11-17 | Stop reason: HOSPADM

## 2023-11-17 RX ORDER — ACETAMINOPHEN 500 MG
1000 TABLET ORAL ONCE
Status: COMPLETED | OUTPATIENT
Start: 2023-11-17 | End: 2023-11-17

## 2023-11-17 RX ORDER — CALCIUM CARBONATE 500 MG/1
1 TABLET, CHEWABLE ORAL EVERY 4 HOURS PRN
Status: DISCONTINUED | OUTPATIENT
Start: 2023-11-17 | End: 2023-11-19 | Stop reason: HOSPADM

## 2023-11-17 RX ORDER — FAMOTIDINE 10 MG/ML
20 INJECTION, SOLUTION INTRAVENOUS ONCE
Status: COMPLETED | OUTPATIENT
Start: 2023-11-17 | End: 2023-11-17

## 2023-11-17 RX ORDER — IBUPROFEN 800 MG/1
800 TABLET ORAL EVERY 6 HOURS SCHEDULED
Status: DISCONTINUED | OUTPATIENT
Start: 2023-11-17 | End: 2023-11-18 | Stop reason: DRUGHIGH

## 2023-11-17 RX ORDER — ONDANSETRON 2 MG/ML
4 INJECTION INTRAMUSCULAR; INTRAVENOUS ONCE AS NEEDED
Status: COMPLETED | OUTPATIENT
Start: 2023-11-17 | End: 2023-11-17

## 2023-11-17 RX ORDER — OXYTOCIN/0.9 % SODIUM CHLORIDE 30/500 ML
333 PLASTIC BAG, INJECTION (ML) INTRAVENOUS ONCE
Status: DISCONTINUED | OUTPATIENT
Start: 2023-11-17 | End: 2023-11-17 | Stop reason: HOSPADM

## 2023-11-17 RX ORDER — CITRIC ACID/SODIUM CITRATE 334-500MG
30 SOLUTION, ORAL ORAL ONCE
Status: COMPLETED | OUTPATIENT
Start: 2023-11-17 | End: 2023-11-17

## 2023-11-17 RX ORDER — MORPHINE SULFATE 2 MG/ML
2 INJECTION, SOLUTION INTRAMUSCULAR; INTRAVENOUS
Status: DISPENSED | OUTPATIENT
Start: 2023-11-17 | End: 2023-11-18

## 2023-11-17 RX ORDER — KETOROLAC TROMETHAMINE 30 MG/ML
30 INJECTION, SOLUTION INTRAMUSCULAR; INTRAVENOUS ONCE
Status: DISCONTINUED | OUTPATIENT
Start: 2023-11-17 | End: 2023-11-17 | Stop reason: HOSPADM

## 2023-11-17 RX ORDER — DIPHENHYDRAMINE HCL 25 MG
25 CAPSULE ORAL EVERY 4 HOURS PRN
Status: DISCONTINUED | OUTPATIENT
Start: 2023-11-17 | End: 2023-11-19 | Stop reason: HOSPADM

## 2023-11-17 RX ORDER — MIDAZOLAM HYDROCHLORIDE 2 MG/2ML
INJECTION, SOLUTION INTRAMUSCULAR; INTRAVENOUS AS NEEDED
Status: DISCONTINUED | OUTPATIENT
Start: 2023-11-17 | End: 2023-11-17 | Stop reason: SURG

## 2023-11-17 RX ORDER — SODIUM CHLORIDE 9 MG/ML
125 INJECTION, SOLUTION INTRAVENOUS CONTINUOUS
Status: DISCONTINUED | OUTPATIENT
Start: 2023-11-17 | End: 2023-11-19 | Stop reason: HOSPADM

## 2023-11-17 RX ORDER — DOCUSATE SODIUM 100 MG/1
100 CAPSULE, LIQUID FILLED ORAL 2 TIMES DAILY PRN
Status: DISCONTINUED | OUTPATIENT
Start: 2023-11-17 | End: 2023-11-19 | Stop reason: HOSPADM

## 2023-11-17 RX ORDER — OXYTOCIN/0.9 % SODIUM CHLORIDE 30/500 ML
125 PLASTIC BAG, INJECTION (ML) INTRAVENOUS CONTINUOUS PRN
Status: DISCONTINUED | OUTPATIENT
Start: 2023-11-17 | End: 2023-11-19 | Stop reason: HOSPADM

## 2023-11-17 RX ORDER — SODIUM CHLORIDE 0.9 % (FLUSH) 0.9 %
10 SYRINGE (ML) INJECTION EVERY 12 HOURS SCHEDULED
Status: DISCONTINUED | OUTPATIENT
Start: 2023-11-17 | End: 2023-11-17 | Stop reason: HOSPADM

## 2023-11-17 RX ORDER — SODIUM CHLORIDE, SODIUM LACTATE, POTASSIUM CHLORIDE, CALCIUM CHLORIDE 600; 310; 30; 20 MG/100ML; MG/100ML; MG/100ML; MG/100ML
125 INJECTION, SOLUTION INTRAVENOUS CONTINUOUS
Status: DISCONTINUED | OUTPATIENT
Start: 2023-11-17 | End: 2023-11-17

## 2023-11-17 RX ORDER — ALUMINA, MAGNESIA, AND SIMETHICONE 2400; 2400; 240 MG/30ML; MG/30ML; MG/30ML
15 SUSPENSION ORAL EVERY 4 HOURS PRN
Status: DISCONTINUED | OUTPATIENT
Start: 2023-11-17 | End: 2023-11-19 | Stop reason: HOSPADM

## 2023-11-17 RX ORDER — OXYCODONE HYDROCHLORIDE 5 MG/1
10 TABLET ORAL EVERY 4 HOURS PRN
Status: DISCONTINUED | OUTPATIENT
Start: 2023-11-17 | End: 2023-11-19 | Stop reason: HOSPADM

## 2023-11-17 RX ORDER — OXYTOCIN 10 [USP'U]/ML
INJECTION, SOLUTION INTRAMUSCULAR; INTRAVENOUS AS NEEDED
Status: DISCONTINUED | OUTPATIENT
Start: 2023-11-17 | End: 2023-11-17 | Stop reason: SURG

## 2023-11-17 RX ORDER — SODIUM CHLORIDE, SODIUM LACTATE, POTASSIUM CHLORIDE, CALCIUM CHLORIDE 600; 310; 30; 20 MG/100ML; MG/100ML; MG/100ML; MG/100ML
INJECTION, SOLUTION INTRAVENOUS CONTINUOUS PRN
Status: DISCONTINUED | OUTPATIENT
Start: 2023-11-17 | End: 2023-11-17 | Stop reason: SURG

## 2023-11-17 RX ORDER — BUPIVACAINE HYDROCHLORIDE 7.5 MG/ML
INJECTION, SOLUTION EPIDURAL; RETROBULBAR
Status: COMPLETED | OUTPATIENT
Start: 2023-11-17 | End: 2023-11-17

## 2023-11-17 RX ORDER — ACETAMINOPHEN 500 MG
1000 TABLET ORAL EVERY 6 HOURS
Status: DISCONTINUED | OUTPATIENT
Start: 2023-11-17 | End: 2023-11-18 | Stop reason: DRUGHIGH

## 2023-11-17 RX ORDER — SCOLOPAMINE TRANSDERMAL SYSTEM 1 MG/1
1 PATCH, EXTENDED RELEASE TRANSDERMAL
Status: DISCONTINUED | OUTPATIENT
Start: 2023-11-17 | End: 2023-11-19 | Stop reason: HOSPADM

## 2023-11-17 RX ORDER — SODIUM CHLORIDE 9 MG/ML
40 INJECTION, SOLUTION INTRAVENOUS AS NEEDED
Status: DISCONTINUED | OUTPATIENT
Start: 2023-11-17 | End: 2023-11-17 | Stop reason: HOSPADM

## 2023-11-17 RX ORDER — METHYLERGONOVINE MALEATE 0.2 MG/ML
200 INJECTION INTRAVENOUS ONCE AS NEEDED
Status: DISCONTINUED | OUTPATIENT
Start: 2023-11-17 | End: 2023-11-17 | Stop reason: HOSPADM

## 2023-11-17 RX ORDER — LIDOCAINE HYDROCHLORIDE 10 MG/ML
0.5 INJECTION, SOLUTION INFILTRATION; PERINEURAL ONCE AS NEEDED
Status: DISCONTINUED | OUTPATIENT
Start: 2023-11-17 | End: 2023-11-17 | Stop reason: HOSPADM

## 2023-11-17 RX ORDER — CEFAZOLIN SODIUM 2 G/50ML
2 SOLUTION INTRAVENOUS ONCE
Status: COMPLETED | OUTPATIENT
Start: 2023-11-17 | End: 2023-11-17

## 2023-11-17 RX ORDER — PRENATAL VIT/IRON FUM/FOLIC AC 27MG-0.8MG
1 TABLET ORAL DAILY
Status: DISCONTINUED | OUTPATIENT
Start: 2023-11-18 | End: 2023-11-19 | Stop reason: HOSPADM

## 2023-11-17 RX ORDER — CARBOPROST TROMETHAMINE 250 UG/ML
250 INJECTION, SOLUTION INTRAMUSCULAR AS NEEDED
Status: DISCONTINUED | OUTPATIENT
Start: 2023-11-17 | End: 2023-11-17 | Stop reason: HOSPADM

## 2023-11-17 RX ORDER — OXYCODONE HYDROCHLORIDE 5 MG/1
5 TABLET ORAL EVERY 4 HOURS PRN
Status: DISCONTINUED | OUTPATIENT
Start: 2023-11-17 | End: 2023-11-19 | Stop reason: HOSPADM

## 2023-11-17 RX ORDER — ONDANSETRON 2 MG/ML
INJECTION INTRAMUSCULAR; INTRAVENOUS AS NEEDED
Status: DISCONTINUED | OUTPATIENT
Start: 2023-11-17 | End: 2023-11-17 | Stop reason: SURG

## 2023-11-17 RX ORDER — MORPHINE SULFATE 2 MG/ML
2 INJECTION, SOLUTION INTRAMUSCULAR; INTRAVENOUS EVERY 4 HOURS PRN
Status: DISCONTINUED | OUTPATIENT
Start: 2023-11-17 | End: 2023-11-19 | Stop reason: HOSPADM

## 2023-11-17 RX ADMIN — BUPIVACAINE HYDROCHLORIDE 1.6 ML: 7.5 INJECTION, SOLUTION EPIDURAL; RETROBULBAR at 12:16

## 2023-11-17 RX ADMIN — MORPHINE SULFATE 2 MG: 1 INJECTION, SOLUTION EPIDURAL; INTRATHECAL; INTRAVENOUS at 12:53

## 2023-11-17 RX ADMIN — SODIUM CHLORIDE, POTASSIUM CHLORIDE, SODIUM LACTATE AND CALCIUM CHLORIDE: 600; 310; 30; 20 INJECTION, SOLUTION INTRAVENOUS at 12:45

## 2023-11-17 RX ADMIN — OXYTOCIN 20 UNITS: 10 INJECTION, SOLUTION INTRAMUSCULAR; INTRAVENOUS at 12:45

## 2023-11-17 RX ADMIN — MIDAZOLAM HYDROCHLORIDE 2 MG: 1 INJECTION, SOLUTION INTRAMUSCULAR; INTRAVENOUS at 12:38

## 2023-11-17 RX ADMIN — IBUPROFEN 800 MG: 800 TABLET ORAL at 17:42

## 2023-11-17 RX ADMIN — SODIUM CHLORIDE, POTASSIUM CHLORIDE, SODIUM LACTATE AND CALCIUM CHLORIDE 999 ML/HR: 600; 310; 30; 20 INJECTION, SOLUTION INTRAVENOUS at 11:00

## 2023-11-17 RX ADMIN — MORPHINE SULFATE 2 MG: 2 INJECTION, SOLUTION INTRAMUSCULAR; INTRAVENOUS at 13:47

## 2023-11-17 RX ADMIN — CEFAZOLIN SODIUM 2 G: 2 SOLUTION INTRAVENOUS at 12:09

## 2023-11-17 RX ADMIN — ACETAMINOPHEN 1000 MG: 500 TABLET, FILM COATED ORAL at 11:44

## 2023-11-17 RX ADMIN — ONDANSETRON 4 MG: 2 INJECTION INTRAMUSCULAR; INTRAVENOUS at 14:29

## 2023-11-17 RX ADMIN — FAMOTIDINE 20 MG: 10 INJECTION INTRAVENOUS at 11:44

## 2023-11-17 RX ADMIN — PROMETHAZINE HYDROCHLORIDE 12.5 MG: 25 INJECTION INTRAMUSCULAR; INTRAVENOUS at 17:32

## 2023-11-17 RX ADMIN — MORPHINE SULFATE 4 MG: 1 INJECTION, SOLUTION EPIDURAL; INTRATHECAL; INTRAVENOUS at 12:37

## 2023-11-17 RX ADMIN — SCOLOPAMINE TRANSDERMAL SYSTEM 1 PATCH: 1 PATCH, EXTENDED RELEASE TRANSDERMAL at 17:34

## 2023-11-17 RX ADMIN — OXYTOCIN 20 UNITS: 10 INJECTION, SOLUTION INTRAMUSCULAR; INTRAVENOUS at 12:35

## 2023-11-17 RX ADMIN — ONDANSETRON 4 MG: 2 INJECTION INTRAMUSCULAR; INTRAVENOUS at 12:09

## 2023-11-17 RX ADMIN — SODIUM CHLORIDE, POTASSIUM CHLORIDE, SODIUM LACTATE AND CALCIUM CHLORIDE: 600; 310; 30; 20 INJECTION, SOLUTION INTRAVENOUS at 12:15

## 2023-11-17 RX ADMIN — SODIUM CHLORIDE, POTASSIUM CHLORIDE, SODIUM LACTATE AND CALCIUM CHLORIDE: 600; 310; 30; 20 INJECTION, SOLUTION INTRAVENOUS at 12:09

## 2023-11-17 RX ADMIN — SODIUM CHLORIDE, POTASSIUM CHLORIDE, SODIUM LACTATE AND CALCIUM CHLORIDE 1000 ML: 600; 310; 30; 20 INJECTION, SOLUTION INTRAVENOUS at 11:53

## 2023-11-17 RX ADMIN — MORPHINE SULFATE 250 MCG: 1 INJECTION, SOLUTION EPIDURAL; INTRATHECAL; INTRAVENOUS at 12:18

## 2023-11-17 RX ADMIN — SODIUM CITRATE AND CITRIC ACID MONOHYDRATE 30 ML: 500; 334 SOLUTION ORAL at 11:43

## 2023-11-17 RX ADMIN — ONDANSETRON 4 MG: 2 INJECTION INTRAMUSCULAR; INTRAVENOUS at 15:48

## 2023-11-17 RX ADMIN — SODIUM CHLORIDE, POTASSIUM CHLORIDE, SODIUM LACTATE AND CALCIUM CHLORIDE: 600; 310; 30; 20 INJECTION, SOLUTION INTRAVENOUS at 12:35

## 2023-11-17 RX ADMIN — ACETAMINOPHEN 1000 MG: 500 TABLET, FILM COATED ORAL at 22:06

## 2023-11-17 RX ADMIN — MORPHINE SULFATE 1.75 MG: 1 INJECTION, SOLUTION EPIDURAL; INTRATHECAL; INTRAVENOUS at 12:46

## 2023-11-17 RX ADMIN — ACETAMINOPHEN 1000 MG: 500 TABLET, FILM COATED ORAL at 15:48

## 2023-11-17 NOTE — ANESTHESIA PROCEDURE NOTES
Spinal Block    Pre-sedation assessment completed: 11/17/2023 12:15 PM    Patient reassessed immediately prior to procedure    Patient location during procedure: OR  Start Time: 11/17/2023 12:16 PM  Stop Time: 11/17/2023 12:18 PM  Indication:at surgeon's request and procedure for pain  Performed By  CRNA/CAA: Shivam Hall, CRNA  Preanesthetic Checklist  Completed: patient identified, IV checked, site marked, risks and benefits discussed, surgical consent, monitors and equipment checked, pre-op evaluation and timeout performed  Spinal Block Prep:  Patient Position:sitting  Sterile Tech:cap, gloves, mask and sterile barriers  Prep:Chloraprep  Patient Monitoring:blood pressure monitoring, continuous pulse oximetry and EKG    Spinal Block Procedure  Approach:midline  Guidance:landmark technique and palpation technique  Location:L4-L5  Needle Type:Hugo  Needle Gauge:25 G  Placement of Spinal needle event:cerebrospinal fluid aspirated  Paresthesia: no  Fluid Appearance:clear  Medications: bupivacaine PF (MARCAINE) injection 0.75% - Epidural   1.6 mL - 11/17/2023 12:16:00 PM   Post Assessment  Patient Tolerance:patient tolerated the procedure well with no apparent complications  Complications no  Additional Notes  Risks discussed , including but not limited to:  Headache, itching, n&v, infection, failure, decreased blood pressure, permanent/chronic back pain, nerve damage, paralysis, etc. All questions answered and informed consent obtained. Skin localized with lidocaine skin wheel. 1.6 ml 0.75% Marcaine with dextrose intrathecal

## 2023-11-17 NOTE — PLAN OF CARE
Problem: Adult Inpatient Plan of Care  Goal: Plan of Care Review  11/17/2023 1839 by Milka Crowder RN  Outcome: Ongoing, Progressing  Flowsheets (Taken 11/17/2023 1839)  Outcome Evaluation: VSS, patient nausea and vomiting now under control, pain well managed with current pain regimen, patient bleeding under control, patient has no complaints at this time, positive bonding observed wih infant. Will continue routine care.  11/17/2023 1839 by Milka Crowder RN  Outcome: Ongoing, Progressing  Flowsheets (Taken 11/17/2023 1839)  Outcome Evaluation: VSS, patient nausea and vomiting now under control, pain well managed with current pain regimen, patient bleeding under control, patient has no complaints at this time, positive bonding observed wih infant. Will continue routine care.  Goal: Patient-Specific Goal (Individualized)  11/17/2023 1839 by Milka Crowder RN  Outcome: Ongoing, Progressing  11/17/2023 1839 by Milka Crowder RN  Outcome: Ongoing, Progressing  Goal: Absence of Hospital-Acquired Illness or Injury  11/17/2023 1839 by Milka Crowder RN  Outcome: Ongoing, Progressing  11/17/2023 1839 by Milka Crowder RN  Outcome: Ongoing, Progressing  Intervention: Identify and Manage Fall Risk  Recent Flowsheet Documentation  Taken 11/17/2023 1800 by Milka Crowder RN  Safety Promotion/Fall Prevention:   safety round/check completed   room organization consistent   nonskid shoes/slippers when out of bed   fall prevention program maintained   clutter free environment maintained   assistive device/personal items within reach   activity supervised  Taken 11/17/2023 1548 by Milka Crowder, RN  Safety Promotion/Fall Prevention:   safety round/check completed   room organization consistent   nonskid shoes/slippers when out of bed   fall prevention program maintained   clutter free environment maintained   assistive device/personal items within reach   activity supervised  Goal: Optimal Comfort and Wellbeing  11/17/2023 1839 by Vel  NASRIN Jarquin  Outcome: Ongoing, Progressing  2023 by Milka Crowder RN  Outcome: Ongoing, Progressing     Problem: Respiratory Compromise ( Delivery)  Goal: Effective Oxygenation and Ventilation  2023 by Milka Crowder RN  Outcome: Ongoing, Progressing  2023 by Milka Crowder RN  Outcome: Ongoing, Progressing     Problem: Adjustment to Role Transition (Postpartum  Delivery)  Goal: Successful Maternal Role Transition  Outcome: Ongoing, Progressing     Problem: Bleeding (Postpartum  Delivery)  Goal: Hemostasis  Outcome: Ongoing, Progressing     Problem: Infection (Postpartum  Delivery)  Goal: Absence of Infection Signs and Symptoms  Outcome: Ongoing, Progressing     Problem: Pain (Postpartum  Delivery)  Goal: Acceptable Pain Control  Outcome: Ongoing, Progressing     Problem: Postoperative Nausea and Vomiting (Postpartum  Delivery)  Goal: Nausea and Vomiting Relief  Outcome: Ongoing, Progressing  Intervention: Prevent or Manage Postoperative Nausea and Vomiting  Recent Flowsheet Documentation  Taken 2023 1546 by Milka Crowder RN  Nausea/Vomiting Interventions: see MAR     Problem: Postoperative Urinary Retention (Postpartum  Delivery)  Goal: Effective Urinary Elimination  Outcome: Ongoing, Progressing   Goal Outcome Evaluation:              Outcome Evaluation: VSS, patient nausea and vomiting now under control, pain well managed with current pain regimen, patient bleeding under control, patient has no complaints at this time, positive bonding observed wih infant. Will continue routine care.

## 2023-11-17 NOTE — NON STRESS TEST
Sayad Tinsley, a  at 39w0d with an GLO of 2023, by Ultrasound, was seen at [facility] for a nonstress test.    Chief Complaint   Patient presents with    Scheduled      With tubal ligation       Patient Active Problem List   Diagnosis    Hx of  section    Carrier of beta thalassemia    Request for sterilization    Pregnancy    Third trimester pregnancy       Start Time: 1036  Stop Time: 1105    Interpretation A  Nonstress Test Interpretation A: Reactive

## 2023-11-17 NOTE — ANESTHESIA POSTPROCEDURE EVALUATION
Patient: Sayda Tinsley    Procedure Summary       Date: 23 Room / Location: [facility] OR 1 /  [facility]    Anesthesia Start: 1208 Anesthesia Stop: 1315    Procedure:  SECTION REPEAT WITH TUBAL (Abdomen) Diagnosis:       Third trimester pregnancy      (Third trimester pregnancy [Z34.93])    Surgeons: Dagoberto Castillo MD Provider: Shivam Hall CRNA    Anesthesia Type: spinal ASA Status: 2            Anesthesia Type: spinal    Vitals  No vitals data found for the desired time range.          Post Anesthesia Care and Evaluation    Patient location during evaluation: bedside  Patient participation: complete - patient participated  Level of consciousness: awake and alert  Pain score: 3  Pain management: satisfactory to patient    Airway patency: patent  Anesthetic complications: No anesthetic complications  PONV Status: none  Cardiovascular status: acceptable and stable  Respiratory status: acceptable and spontaneous ventilation  Hydration status: acceptable  Post Neuraxial Block status: No signs or symptoms of PDPH  Comments: Vitals signs as noted in nursing documentation as per protocol.

## 2023-11-17 NOTE — OP NOTE
Don Tinsley  : 1990  MRN: [id number]  CSN: [id number]    Operative Report    Pre-Operative Dx:   IUP at 39w0d weeks   Previous  section - not a  candidate  Desires sterilization      Postoperative dx:    Same as above     Procedure: Repeat  (LTCS) with bilateral placement of filshie clips       Surgeon: Dagoberto Castillo MD   Assistant: staff  was responsible for performing the following activities: Retraction and Placing Dressing and their skilled assistance was necessary for the success of this case.         Anesthesia: Spinal        EBL: <950 mls.       Antibiotics: cefazolin 2 gms     Drains: Perkins     Findings: VMI, 3VC, omental adhesions, intact placenta, normal adnexa       Indications: Patient is a 33-year-old prior  presenting at term for repeat also desired permanent sterilization.  Risks benefits and alternatives discussed all questions answered.           Procedure Details:   After the appropriate time out, the patient was prepped and draped in the usual sterile fashion.  She had been placed in the dorsal supine left lateral tilt position.  A perkins catheter had been placed in the bladder for drainage during the procedure. A pfannenstiel skin incision was made with a knife and carried down to the fascia.  The fascia was nicked with a scalpel and the incision was extended bilaterally with curved López scissors. The superior aspect of the fascia was grasped with Kocher clamps x 2 and bluntly as well as sharply dissected away from the underlying rectus muscles.  A similar process was repeated inferiorly. The rectus muscles were  and the peritoneal cavity was entered sharply without complications. The bladder flap was created with Metzenbaum scissors and pickups with teeth.  The  retractor was placed and after checking for no entrapment, was retracted down.  A transverse uterine incision was made with the knife and extended  bilaterally.  The infant was delivered from the vertex presentation.  The mouth and nose were bulb suctioned.  The cord was doubly clamped and cut and the infant handed to the pediatric nurse in attendance.  Cord blood was obtained.  The placenta was manually extracted from the uterus.  The uterus was then elevated from the abdomen and wiped free of remaining membranes.  The uterine incision was closed with #1 chromic in a running locking fashion with a second imbricating stitch.  The uterus had been returned to the abdomen.  The lateral gutters were wiped free of remaining clots.  [place] suture 2-0 chromic x1 was used to complete the hemostatic process.  The site of surgical incision was inspected and noted to be hemostatic.  Tubes were traced to the fimbriated ends.  Filshie clips x2 on each side were placed in close approximation transverse to the long axis of the tubes without incident.  The  retractor was removed.  The subfascial tissue was inspected and noted to be hemostatic.  Peritoneum was closed with 3-0 Vicryl suture in a running fashion.  The fascia was closed with 0 PDS in a running non-locking fashion.  Subcutaneous tissue was inspected and noted to be hemostatic with minimal bovie electrocautery.  Elinor's fascia was closed with 3-0 Vicryl in a running non-locking fashion.  The skin was approximated with 4-0 Vicryl on a Steven needle in a running subcuticular fashion.  Glue and Steris placed.. Dressings were placed.  All instrument and sponge counts were correct at the end of the procedure. The patient tolerated the procedure well.  There were no complications.  She was taken to postoperative recovery room in stable condition.          Complications:   None      Disposition:   Mother to Mother Baby/Postpartum  in stable condition currently.   Baby to NBN  in stable condition currently.     Dagoberto Castillo MD  2023  13:19 EST

## 2023-11-18 LAB
ANISOCYTOSIS BLD QL: NORMAL
BASOPHILS # BLD AUTO: 0.03 10*3/MM3 (ref 0–0.2)
BASOPHILS NFR BLD AUTO: 0.4 % (ref 0–1.5)
DEPRECATED RDW RBC AUTO: 38.5 FL (ref 37–54)
ELLIPTOCYTES BLD QL SMEAR: NORMAL
EOSINOPHIL # BLD AUTO: 0.05 10*3/MM3 (ref 0–0.4)
EOSINOPHIL NFR BLD AUTO: 0.6 % (ref 0.3–6.2)
ERYTHROCYTE [DISTWIDTH] IN BLOOD BY AUTOMATED COUNT: 17.2 % (ref 12.3–15.4)
HCT VFR BLD AUTO: 22.3 % (ref 34–46.6)
HGB BLD-MCNC: 6.9 G/DL (ref 12–15.9)
IMM GRANULOCYTES # BLD AUTO: 0.03 10*3/MM3 (ref 0–0.05)
IMM GRANULOCYTES NFR BLD AUTO: 0.4 % (ref 0–0.5)
LYMPHOCYTES # BLD AUTO: 1.87 10*3/MM3 (ref 0.7–3.1)
LYMPHOCYTES NFR BLD AUTO: 22.5 % (ref 19.6–45.3)
MCH RBC QN AUTO: 20.4 PG (ref 26.6–33)
MCHC RBC AUTO-ENTMCNC: 30.9 G/DL (ref 31.5–35.7)
MCV RBC AUTO: 66 FL (ref 79–97)
MICROCYTES BLD QL: NORMAL
MONOCYTES # BLD AUTO: 0.65 10*3/MM3 (ref 0.1–0.9)
MONOCYTES NFR BLD AUTO: 7.8 % (ref 5–12)
NEUTROPHILS NFR BLD AUTO: 5.69 10*3/MM3 (ref 1.7–7)
NEUTROPHILS NFR BLD AUTO: 68.3 % (ref 42.7–76)
NRBC BLD AUTO-RTO: 0 /100 WBC (ref 0–0.2)
PLATELET # BLD AUTO: 141 10*3/MM3 (ref 140–450)
POIKILOCYTOSIS BLD QL SMEAR: NORMAL
RBC # BLD AUTO: 3.38 10*6/MM3 (ref 3.77–5.28)
SMALL PLATELETS BLD QL SMEAR: ADEQUATE
WBC MORPH BLD: NORMAL
WBC NRBC COR # BLD AUTO: 8.32 10*3/MM3 (ref 3.4–10.8)

## 2023-11-18 PROCEDURE — 90471 IMMUNIZATION ADMIN: CPT | Performed by: OBSTETRICS & GYNECOLOGY

## 2023-11-18 PROCEDURE — 85025 COMPLETE CBC W/AUTO DIFF WBC: CPT | Performed by: OBSTETRICS & GYNECOLOGY

## 2023-11-18 PROCEDURE — 63710000001 DIPHENHYDRAMINE PER 50 MG: Performed by: OBSTETRICS & GYNECOLOGY

## 2023-11-18 PROCEDURE — 25010000002 TETANUS-DIPHTH-ACELL PERTUSSIS 5-2.5-18.5 LF-MCG/0.5 SUSPENSION PREFILLED SYRINGE: Performed by: OBSTETRICS & GYNECOLOGY

## 2023-11-18 PROCEDURE — 0503F POSTPARTUM CARE VISIT: CPT | Performed by: OBSTETRICS & GYNECOLOGY

## 2023-11-18 PROCEDURE — 86900 BLOOD TYPING SEROLOGIC ABO: CPT

## 2023-11-18 PROCEDURE — 36430 TRANSFUSION BLD/BLD COMPNT: CPT

## 2023-11-18 PROCEDURE — 85007 BL SMEAR W/DIFF WBC COUNT: CPT | Performed by: OBSTETRICS & GYNECOLOGY

## 2023-11-18 PROCEDURE — 90715 TDAP VACCINE 7 YRS/> IM: CPT | Performed by: OBSTETRICS & GYNECOLOGY

## 2023-11-18 PROCEDURE — P9016 RBC LEUKOCYTES REDUCED: HCPCS

## 2023-11-18 RX ORDER — ACETAMINOPHEN 650 MG/1
650 SUPPOSITORY RECTAL ONCE
Status: COMPLETED | OUTPATIENT
Start: 2023-11-18 | End: 2023-11-18

## 2023-11-18 RX ORDER — IBUPROFEN 800 MG/1
800 TABLET ORAL EVERY 8 HOURS SCHEDULED
Status: DISCONTINUED | OUTPATIENT
Start: 2023-11-18 | End: 2023-11-19 | Stop reason: HOSPADM

## 2023-11-18 RX ORDER — ACETAMINOPHEN 160 MG/5ML
650 SOLUTION ORAL ONCE
Status: COMPLETED | OUTPATIENT
Start: 2023-11-18 | End: 2023-11-18

## 2023-11-18 RX ORDER — DIPHENHYDRAMINE HYDROCHLORIDE 50 MG/ML
25 INJECTION INTRAMUSCULAR; INTRAVENOUS ONCE
Status: COMPLETED | OUTPATIENT
Start: 2023-11-18 | End: 2023-11-18

## 2023-11-18 RX ORDER — DIPHENHYDRAMINE HCL 25 MG
25 CAPSULE ORAL ONCE
Status: COMPLETED | OUTPATIENT
Start: 2023-11-18 | End: 2023-11-18

## 2023-11-18 RX ORDER — ACETAMINOPHEN 500 MG
1000 TABLET ORAL EVERY 8 HOURS
Status: DISCONTINUED | OUTPATIENT
Start: 2023-11-18 | End: 2023-11-19 | Stop reason: HOSPADM

## 2023-11-18 RX ORDER — ACETAMINOPHEN 325 MG/1
650 TABLET ORAL ONCE
Status: COMPLETED | OUTPATIENT
Start: 2023-11-18 | End: 2023-11-18

## 2023-11-18 RX ADMIN — IBUPROFEN 800 MG: 800 TABLET ORAL at 13:57

## 2023-11-18 RX ADMIN — DIPHENHYDRAMINE HYDROCHLORIDE 25 MG: 25 CAPSULE ORAL at 09:15

## 2023-11-18 RX ADMIN — PRENATAL VITAMINS-IRON FUMARATE 27 MG IRON-FOLIC ACID 0.8 MG TABLET 1 TABLET: at 09:15

## 2023-11-18 RX ADMIN — IBUPROFEN 800 MG: 800 TABLET ORAL at 09:15

## 2023-11-18 RX ADMIN — IBUPROFEN 800 MG: 800 TABLET ORAL at 22:32

## 2023-11-18 RX ADMIN — ACETAMINOPHEN 1000 MG: 500 TABLET, FILM COATED ORAL at 19:57

## 2023-11-18 RX ADMIN — IBUPROFEN 800 MG: 800 TABLET ORAL at 00:15

## 2023-11-18 RX ADMIN — ACETAMINOPHEN 650 MG: 325 TABLET, FILM COATED ORAL at 09:14

## 2023-11-18 RX ADMIN — SIMETHICONE 80 MG: 80 TABLET, CHEWABLE ORAL at 18:23

## 2023-11-18 RX ADMIN — ACETAMINOPHEN 1000 MG: 500 TABLET, FILM COATED ORAL at 04:13

## 2023-11-18 RX ADMIN — OXYCODONE 10 MG: 5 TABLET ORAL at 18:17

## 2023-11-18 RX ADMIN — TETANUS TOXOID, REDUCED DIPHTHERIA TOXOID AND ACELLULAR PERTUSSIS VACCINE, ADSORBED 0.5 ML: 5; 2.5; 8; 8; 2.5 SUSPENSION INTRAMUSCULAR at 17:12

## 2023-11-18 NOTE — PLAN OF CARE
Goal Outcome Evaluation:  Plan of Care Reviewed With: patient, spouse        Progress: improving  Outcome Evaluation: VSS this shift. Patient reports adequate pain relief with motrin and tylenol. 2 units RBC transfused, patient tolerated well. Bottlefeeding going well. Positive bonding observed with . Anticipating discharge home tomorrow.

## 2023-11-18 NOTE — PLAN OF CARE
Goal Outcome Evaluation:              Outcome Evaluation: VSS. adequate pain relief, no more nausea ,taking fluids well,bleeding WNL,bottlefeeding, + bonding,,continue with routine care

## 2023-11-18 NOTE — PROGRESS NOTES
Don Tinsley  : 1990  MRN: [id number]  CSN: [id number]    Post-operative Day #1  History of Present Illness:  Her pain is well controlled.  Vaginal bleeding is appropriate amount.  She is not passing gas and has not had a bowel movement.  Upon review of her respiratory system, she has no respiratory symptoms and and denies SOB, cough, wheezing, chest pain.  Patient did have nausea and emesis yesterday.  The patient has only been up to the bathroom to void.  She denies being dizzy or lightheaded but reports she has not been up much.  She denies any palpitations.  She has had no emesis this morning.    Vitals:  Min/max vitals past 24 hours:   Temp  Min: 97.4 °F (36.3 °C)  Max: 98.3 °F (36.8 °C)  BP  Min: 90/63  Max: 143/95  Pulse  Min: 66  Max: 107  Resp  Min: 16  Max: 20    Fluid balance past 24 hours:  I/O last 3 completed shifts:  In: 3420 [P.O.:120; I.V.:3250; IV Piggyback:50]  Out: 2234 [Urine:1750; Emesis/NG output:50; Blood:434]         Physical Exam:  General Appearance: well developed, well nourished, not in any acute distress   Respiratory: breathing is unlabored, clear to auscultation bilaterally   CV: regular rate and rhythm, S1, S2 normal, no murmur, click, rub or gallop   Abdomen: soft, non-tender, no palpable masses; fundus firm and non-tender  incision is covered by bandage   Pelvic: not performed   Extremities: moves extremities well; normal appearance with no cyanosis or edema and no calf tenderness     Results Review:  I reviewed the patient's new clinical results.    Lab Results (last 24 hours)       Procedure Component Value Units Date/Time    CBC & Differential [[id number]]  (Abnormal) Collected: 23    Specimen: Blood Updated: 23    Narrative:      The following orders were created for panel order CBC & Differential.  Procedure                               Abnormality         Status                     ---------                               -----------          ------                     CBC Auto Differential[[id number]]        Abnormal            Final result               Scan Slide[[id number]]                                       Final result                 Please view results for these tests on the individual orders.    Scan Slide [[id number]] Collected: 11/18/23 0644    Specimen: Blood Updated: 11/18/23 0738     Anisocytosis Slight/1+     Elliptocytes Slight/1+     Microcytes Slight/1+     Poikilocytes Slight/1+     WBC Morphology Normal     Platelet Estimate Adequate    CBC Auto Differential [[id number]]  (Abnormal) Collected: 11/18/23 0644    Specimen: Blood Updated: 11/18/23 0716     WBC 8.32 10*3/mm3      RBC 3.38 10*6/mm3      Hemoglobin 6.9 g/dL      Hematocrit 22.3 %      MCV 66.0 fL      MCH 20.4 pg      MCHC 30.9 g/dL      RDW 17.2 %      RDW-SD 38.5 fl      Platelets 141 10*3/mm3      Neutrophil % 68.3 %      Lymphocyte % 22.5 %      Monocyte % 7.8 %      Eosinophil % 0.6 %      Basophil % 0.4 %      Immature Grans % 0.4 %      Neutrophils, Absolute 5.69 10*3/mm3      Lymphocytes, Absolute 1.87 10*3/mm3      Monocytes, Absolute 0.65 10*3/mm3      Eosinophils, Absolute 0.05 10*3/mm3      Basophils, Absolute 0.03 10*3/mm3      Immature Grans, Absolute 0.03 10*3/mm3      nRBC 0.0 /100 WBC     CBC & Differential [[id number]]  (Abnormal) Collected: 11/17/23 1054    Specimen: Blood Updated: 11/17/23 1129    Narrative:      The following orders were created for panel order CBC & Differential.  Procedure                               Abnormality         Status                     ---------                               -----------         ------                     CBC Auto Differential[[id number]]        Abnormal            Final result               Scan Slide[[id number]]                                       Final result                 Please view results for these tests on the individual orders.    CBC Auto Differential [[id number]]  (Abnormal) Collected:  23    Specimen: Blood Updated: 23 1129     WBC 10.04 10*3/mm3      RBC 4.24 10*6/mm3      Hemoglobin 8.5 g/dL      Hematocrit 27.5 %      MCV 64.9 fL      MCH 20.0 pg      MCHC 30.9 g/dL      RDW 17.5 %      RDW-SD 38.4 fl      Platelets 190 10*3/mm3      Neutrophil % 69.1 %      Lymphocyte % 23.5 %      Monocyte % 6.2 %      Eosinophil % 0.6 %      Basophil % 0.3 %      Immature Grans % 0.3 %      Neutrophils, Absolute 6.94 10*3/mm3      Lymphocytes, Absolute 2.36 10*3/mm3      Monocytes, Absolute 0.62 10*3/mm3      Eosinophils, Absolute 0.06 10*3/mm3      Basophils, Absolute 0.03 10*3/mm3      Immature Grans, Absolute 0.03 10*3/mm3      nRBC 0.0 /100 WBC     Scan Slide [[id number]] Collected: 23    Specimen: Blood Updated: 23 1129     Anisocytosis Slight/1+     Microcytes Slight/1+     WBC Morphology Normal     Platelet Estimate Adequate          Imaging Results (Last 24 Hours)       ** No results found for the last 24 hours. **          Prenatal Labs:  Lab Results   Component Value Date    RUBELLAABIGG 63.8 2023    RH Positive 2023    HEPBSAG Negative 2023     Assessment   POD #1 S/P Repeat  (LTCS) with BTL  Anemia     Plan   Continue routine post-operative care  Ambulate  Advance diet  I discussed with the patient and her significant other her hemoglobin this morning.  Patient is in agreement with transfusion of 2 units of blood.  I have discussed with the patient the risk and complications of the transfusion versus the benefits.  We will check CBC following her transfusion.  Possible discharge to home tomorrow.    Flaquita Torres M.D.  2023  08:16 EST

## 2023-11-18 NOTE — CONSULTS
"Patient: Sayda Tinsley  Procedure(s):   SECTION REPEAT WITH TUBAL  Anesthesia type: Spinal    Patient location: Labor and Delivery  Last vitals: /61 (BP Location: Right arm, Patient Position: Lying)   Pulse 81   Temp 97.8 °F (36.6 °C) (Oral)   Resp 18   Ht 165.1 cm (65\")   Wt 113 kg (249 lb)   SpO2 97%   Breastfeeding No   BMI 41.44 kg/m²   Level of consciousness: awake, alert, and oriented    Post-anesthesia pain: adequate analgesia  Airway patency: patent  Respiratory: unassisted  Cardiovascular: stable and blood pressure at baseline  Hydration: euvolemic    Anesthetic complications: no  "

## 2023-11-19 VITALS
OXYGEN SATURATION: 97 % | TEMPERATURE: 97.7 F | HEART RATE: 70 BPM | HEIGHT: 65 IN | BODY MASS INDEX: 41.48 KG/M2 | SYSTOLIC BLOOD PRESSURE: 115 MMHG | WEIGHT: 249 LBS | RESPIRATION RATE: 18 BRPM | DIASTOLIC BLOOD PRESSURE: 64 MMHG

## 2023-11-19 LAB
BH BB BLOOD EXPIRATION DATE: NORMAL
BH BB BLOOD EXPIRATION DATE: NORMAL
BH BB BLOOD TYPE BARCODE: 5100
BH BB BLOOD TYPE BARCODE: 5100
BH BB DISPENSE STATUS: NORMAL
BH BB DISPENSE STATUS: NORMAL
BH BB PRODUCT CODE: NORMAL
BH BB PRODUCT CODE: NORMAL
BH BB UNIT NUMBER: NORMAL
BH BB UNIT NUMBER: NORMAL
CROSSMATCH INTERPRETATION: NORMAL
CROSSMATCH INTERPRETATION: NORMAL
DEPRECATED RDW RBC AUTO: 41.9 FL (ref 37–54)
ERYTHROCYTE [DISTWIDTH] IN BLOOD BY AUTOMATED COUNT: 18.1 % (ref 12.3–15.4)
HCT VFR BLD AUTO: 26.8 % (ref 34–46.6)
HGB BLD-MCNC: 8.4 G/DL (ref 12–15.9)
MCH RBC QN AUTO: 21.3 PG (ref 26.6–33)
MCHC RBC AUTO-ENTMCNC: 31.3 G/DL (ref 31.5–35.7)
MCV RBC AUTO: 68 FL (ref 79–97)
PLATELET # BLD AUTO: 164 10*3/MM3 (ref 140–450)
PMV BLD AUTO: ABNORMAL FL
RBC # BLD AUTO: 3.94 10*6/MM3 (ref 3.77–5.28)
UNIT  ABO: NORMAL
UNIT  ABO: NORMAL
UNIT  RH: NORMAL
UNIT  RH: NORMAL
WBC NRBC COR # BLD AUTO: 8.97 10*3/MM3 (ref 3.4–10.8)

## 2023-11-19 PROCEDURE — 85027 COMPLETE CBC AUTOMATED: CPT | Performed by: OBSTETRICS & GYNECOLOGY

## 2023-11-19 PROCEDURE — 99238 HOSP IP/OBS DSCHRG MGMT 30/<: CPT | Performed by: OBSTETRICS & GYNECOLOGY

## 2023-11-19 RX ORDER — DOCUSATE SODIUM 100 MG/1
100 CAPSULE, LIQUID FILLED ORAL 2 TIMES DAILY
Qty: 60 CAPSULE | Refills: 0 | Status: SHIPPED | OUTPATIENT
Start: 2023-11-19

## 2023-11-19 RX ORDER — OXYCODONE HYDROCHLORIDE 5 MG/1
5 TABLET ORAL EVERY 8 HOURS PRN
Qty: 15 TABLET | Refills: 0 | Status: SHIPPED | OUTPATIENT
Start: 2023-11-19 | End: 2023-11-27

## 2023-11-19 RX ORDER — IBUPROFEN 800 MG/1
800 TABLET ORAL EVERY 8 HOURS PRN
Qty: 30 TABLET | Refills: 0 | Status: SHIPPED | OUTPATIENT
Start: 2023-11-19

## 2023-11-19 RX ORDER — FERROUS SULFATE 325(65) MG
325 TABLET ORAL
Qty: 60 TABLET | Refills: 0 | Status: SHIPPED | OUTPATIENT
Start: 2023-11-19

## 2023-11-19 RX ORDER — ACETAMINOPHEN 500 MG
1000 TABLET ORAL EVERY 8 HOURS PRN
Qty: 60 TABLET | Refills: 0 | Status: SHIPPED | OUTPATIENT
Start: 2023-11-19

## 2023-11-19 RX ADMIN — ACETAMINOPHEN 1000 MG: 500 TABLET, FILM COATED ORAL at 03:20

## 2023-11-19 RX ADMIN — IBUPROFEN 800 MG: 800 TABLET ORAL at 06:01

## 2023-11-19 NOTE — DISCHARGE SUMMARY
Discharge Summary     Don Tinsley  : 1990  MRN: [id number]  CSN: [id number]    Date of Admission: 2023   Date of Discharge:  2023   Delivering Physician: Dagoberto Castillo MD       Admission Diagnosis: Pregnancy [Z34.90] 39 weeks gestation  Prior  section  Desires permanent surgical sterilization  Iron deficiency anemia during pregnancy  Beta thalassemia minor   Discharge Diagnosis: Same as above plus  Pregnancy at 39w0d - delivered  Postpartum anemia       Procedures: Repeat  (LTCS) with [facility]     Hospital Course  See the completed operative report for details regarding antepartum course and delivery.    Her post-operative course was unremarkable.  On post op day #1 the patient's hemoglobin returned with critical value of 6.9.  The patient had not been ambulating very much at that point.  She did have fatigue.  She had normal amount of vaginal bleeding.  I discussed with the patient the various options and she was in agreement with blood transfusion of 2 units of packed red blood cells.  She received her 2 units of packed red blood cells without any complications.  On POD # 2 she felt ready for discharge.  Her posttransfusion hemoglobin returned at 8.4.  She was evaluated and it was determined she was able to be discharged to home.  She had no febrile morbidity.  She had normal bowel and bladder function and was hemodynamically stable.  Her wound was healing well without obvious signs of infection.  Discharge instructions and precautions were given.    Vitals  Min/max vitals past 24 hours:   Temp  Min: 97.7 °F (36.5 °C)  Max: 99.1 °F (37.3 °C)  BP  Min: 98/63  Max: 124/72  Pulse  Min: 60  Max: 80  Resp  Min: 16  Max: 18    Fluid balance past 24 hours:  I/O last 3 completed shifts:  In: 1177.5 [P.O.:840; Blood:337.5]  Out: 1600 [Urine:1600]           Physical Exam  General Appearance: well developed, well nourished, not in any acute distress   Respiratory:  breathing is unlabored, clear to auscultation bilaterally   CV: regular rate and rhythm, S1, S2 normal, no murmur, click, rub or gallop   Abdomen: soft, non-tender, no palpable masses; soft, non-tender; no masses  fundus firm and non-tender  incision is covered by bandage   Pelvic: not performed   Extremities: moves extremities well; normal appearance with no cyanosis or edema and no calf tenderness     Infant  male  fetus weighing 3657 g (8 lb 1 oz)   Apgars -  8 @ 1 minute /  9 @ 5 minutes.    Discharge labs  Lab Results   Component Value Date    WBC 8.97 11/19/2023    HGB 8.4 (L) 11/19/2023    HCT 26.8 (L) 11/19/2023     11/19/2023       Discharge Medications     Discharge Medications        New Medications        Instructions Start Date   acetaminophen 500 MG tablet  Commonly known as: TYLENOL   1,000 mg, Oral, Every 8 Hours PRN      docusate sodium 100 MG capsule  Commonly known as: Colace   100 mg, Oral, 2 Times Daily      ibuprofen 800 MG tablet  Commonly known as: ADVIL,MOTRIN   800 mg, Oral, Every 8 Hours PRN      oxyCODONE 5 MG immediate release tablet  Commonly known as: ROXICODONE   5 mg, Oral, Every 8 Hours PRN             Changes to Medications        Instructions Start Date   ferrous sulfate 325 (65 FE) MG tablet  What changed: Another medication with the same name was added. Make sure you understand how and when to take each.   325 mg, Oral, 2 Times Daily Before Meals      ferrous sulfate 325 (65 FE) MG tablet  What changed: You were already taking a medication with the same name, and this prescription was added. Make sure you understand how and when to take each.   325 mg, Oral, 2 Times Daily Before Meals             Continue These Medications        Instructions Start Date   multivitamin with minerals tablet tablet   1 tablet, Oral, Daily      Vitamin D (Cholecalciferol) 25 MCG (1000 UT) capsule   Oral               Discharge Disposition: Home   Condition on Discharge: Stable   Follow-up: 1  week with [facility].   Time spent: 20  minutes  Flaquita Torres M.D.  11/19/2023

## 2023-11-19 NOTE — PLAN OF CARE
Goal Outcome Evaluation:  Plan of Care Reviewed With: patient           Outcome Evaluation: VSS, I&O adequate throughout shift, pain adequately controlled with scheduled Tylenol/ Ibuprofen regimen, positive bonding with  observed, formula feeding is going well, ambulating in room and hallway, anticipates being discharged home later today.

## 2023-11-27 ENCOUNTER — OFFICE VISIT (OUTPATIENT)
Dept: OBSTETRICS AND GYNECOLOGY | Facility: CLINIC | Age: 33
End: 2023-11-27
Payer: COMMERCIAL

## 2023-11-27 VITALS
BODY MASS INDEX: 38.99 KG/M2 | SYSTOLIC BLOOD PRESSURE: 122 MMHG | HEIGHT: 65 IN | DIASTOLIC BLOOD PRESSURE: 86 MMHG | WEIGHT: 234 LBS

## 2023-11-27 DIAGNOSIS — Z98.891 STATUS POST CESAREAN SECTION ROUTINE POSTPARTUM FOLLOW-UP: Primary | ICD-10-CM

## 2023-11-27 PROCEDURE — 99024 POSTOP FOLLOW-UP VISIT: CPT | Performed by: PHYSICIAN ASSISTANT

## 2023-11-27 NOTE — PROGRESS NOTES
Subjective   Chief Complaint   Patient presents with    Post-op     Repeat  with tubal ligation- 10 days post op       Sayda Tinsley is a 33 y.o. year old  presenting to be seen for post op check.  Doing well postoperatively  Normal bowel and bladder function  Lochia light  Bottlefeeding      Past Medical History:   Diagnosis Date    Anemia     Anxiety     Asthma     Hypertension     PMS (premenstrual syndrome)     Pregnancy 10/02/2023        Current Outpatient Medications:     acetaminophen (TYLENOL) 500 MG tablet, Take 2 tablets by mouth Every 8 (Eight) Hours As Needed for Mild Pain or Moderate Pain., Disp: 60 tablet, Rfl: 0    docusate sodium (Colace) 100 MG capsule, Take 1 capsule by mouth 2 (Two) Times a Day., Disp: 60 capsule, Rfl: 0    ferrous sulfate 325 (65 FE) MG tablet, Take 1 tablet by mouth 2 (Two) Times a Day Before Meals., Disp: 60 tablet, Rfl: 6    ferrous sulfate 325 (65 FE) MG tablet, Take 1 tablet by mouth 2 (Two) Times a Day Before Meals., Disp: 60 tablet, Rfl: 0    ibuprofen (ADVIL,MOTRIN) 800 MG tablet, Take 1 tablet by mouth Every 8 (Eight) Hours As Needed for Mild Pain or Moderate Pain., Disp: 30 tablet, Rfl: 0    multivitamin with minerals tablet tablet, Take 1 tablet by mouth Daily., Disp: , Rfl:     Vitamin D, Cholecalciferol, 25 MCG (1000 UT) capsule, Take  by mouth., Disp: , Rfl:    Allergies   Allergen Reactions    Penicillins Hives      Past Surgical History:   Procedure Laterality Date     SECTION  21     SECTION WITH TUBAL N/A 2023    Procedure:  SECTION REPEAT WITH TUBAL;  Surgeon: Dagoberto Castillo MD;  [facility];  Service: Obstetrics/Gynecology;  Laterality: N/A;    EAR TUBES      WISDOM TOOTH EXTRACTION        Social History     Socioeconomic History    Marital status:      Spouse name: aman    Number of children: 1    Highest education level: High school graduate   Tobacco Use    Smoking  "status: Former     Packs/day: 0.25     Years: 15.00     Additional pack years: 0.00     Total pack years: 3.75     Types: Cigarettes     Quit date: 3/17/2023     Years since quittin.6    Tobacco comments:     Not sure on start date   Vaping Use    Vaping Use: Never used   Substance and Sexual Activity    Alcohol use: Not Currently     Comment: Only social events    Drug use: Never    Sexual activity: Yes     Partners: Male     Birth control/protection: Condom      Family History   Problem Relation Age of Onset    Coronary artery disease Father     Hypertension Father     Diabetes Sister         Pre-Diabetic    Diabetes Maternal Grandfather     Melanoma Paternal Grandmother     Osteoporosis Paternal Grandmother     Hypertension Paternal Grandmother        Review of Systems   Constitutional:  Negative for chills, diaphoresis and fever.   Gastrointestinal: Negative.    Genitourinary:  Negative for difficulty urinating and dysuria.           Objective   /86   Ht 165.1 cm (65\")   Wt 106 kg (234 lb)   BMI 38.94 kg/m²     Physical Exam  Constitutional:       Appearance: Normal appearance. She is well-developed and well-groomed.   Eyes:      General: Lids are normal.      Extraocular Movements: Extraocular movements intact.      Conjunctiva/sclera: Conjunctivae normal.   Abdominal:      General: There is no distension.      Palpations: Abdomen is soft.      Tenderness: There is no abdominal tenderness.      Comments: Incision healing well   Neurological:      Mental Status: She is alert.   Psychiatric:         Attention and Perception: Attention normal.         Mood and Affect: Mood normal.         Speech: Speech normal.         Behavior: Behavior is cooperative.            Result Review :                   Assessment and Plan  Diagnoses and all orders for this visit:    1. Status post  section routine postpartum follow-up (Primary)      Patient Instructions   Continue light activity  RTO 4 weeks or " prn           This note was electronically signed.    Cynthia Hull PA-C   November 27, 2023

## 2023-12-12 ENCOUNTER — TELEPHONE (OUTPATIENT)
Dept: OBSTETRICS AND GYNECOLOGY | Facility: CLINIC | Age: 33
End: 2023-12-12

## 2023-12-12 NOTE — TELEPHONE ENCOUNTER
Caller: Sayda Tinsley    Relationship to patient: Self    Best call back number: [phone]    Chief complaint: CANCELLED HER 12/26/23 POSTPARTUM AND NEEDS TO RESCHEDULE     Type of visit: POSTPARTUM    Requested date: 12/21 OR 12/22     If rescheduling, when is the original appointment: 12/26/23     Additional notes:OFFICE WAS CLOSED//PLEASE FOLLOW UP

## 2024-01-02 ENCOUNTER — POSTPARTUM VISIT (OUTPATIENT)
Dept: OBSTETRICS AND GYNECOLOGY | Facility: CLINIC | Age: 34
End: 2024-01-02
Payer: COMMERCIAL

## 2024-01-02 VITALS
HEIGHT: 65 IN | WEIGHT: 227 LBS | SYSTOLIC BLOOD PRESSURE: 124 MMHG | BODY MASS INDEX: 37.82 KG/M2 | DIASTOLIC BLOOD PRESSURE: 80 MMHG

## 2024-01-02 PROBLEM — Z34.90 PREGNANCY: Status: RESOLVED | Noted: 2023-10-02 | Resolved: 2024-01-02

## 2024-01-02 PROBLEM — Z98.891 HX OF CESAREAN SECTION: Status: RESOLVED | Noted: 2023-07-13 | Resolved: 2024-01-02

## 2024-01-02 PROBLEM — Z34.93 THIRD TRIMESTER PREGNANCY: Status: RESOLVED | Noted: 2023-10-03 | Resolved: 2024-01-02

## 2024-01-02 PROBLEM — Z30.2 REQUEST FOR STERILIZATION: Status: RESOLVED | Noted: 2023-08-28 | Resolved: 2024-01-02

## 2024-01-02 PROCEDURE — 0503F POSTPARTUM CARE VISIT: CPT | Performed by: MIDWIFE

## 2024-01-02 NOTE — PROGRESS NOTES
"History  Chief Complaint   Patient presents with    Postpartum Care     6 week postpartum care         Sayda Tinsley is a 33 y.o. year old  presenting to be seen for her postpartum visit.  She had a Repeat  (LTCS) with BTL by Gurpreet griggs.    Since delivery she has not been sexually active.   She does not have concerns about post-partum blues/depression.   She is bottle feeding.  For ongoing contraception, her plans are tubal ligation.  She has had a menstrual cycle. LMP 24         Physical  /80   Ht 165.1 cm (65\")   Wt 103 kg (227 lb)   Breastfeeding No   BMI 37.77 kg/m²     General:  well developed; well nourished  no acute distress   Abdomen: soft, non-tender; no masses  incision is healed   Pelvis: Not performed.        Assessment   Normal 6 week postpartum exam  Contraceptive management     Plan   BC options reviewed and compared today: tubal sterilization  Pap smear due  Follow up for annual exam    No orders of the defined types were placed in this encounter.         This note was electronically signed.  Jodie Faulkner, FÁTIMA  2024  "

## 2024-04-01 ENCOUNTER — OFFICE VISIT (OUTPATIENT)
Dept: INTERNAL MEDICINE | Facility: CLINIC | Age: 34
End: 2024-04-01
Payer: COMMERCIAL

## 2024-04-01 VITALS
HEART RATE: 70 BPM | RESPIRATION RATE: 16 BRPM | WEIGHT: 231 LBS | SYSTOLIC BLOOD PRESSURE: 152 MMHG | BODY MASS INDEX: 38.49 KG/M2 | DIASTOLIC BLOOD PRESSURE: 104 MMHG | OXYGEN SATURATION: 99 % | HEIGHT: 65 IN

## 2024-04-01 DIAGNOSIS — I10 HYPERTENSION, UNSPECIFIED TYPE: Primary | ICD-10-CM

## 2024-04-01 DIAGNOSIS — E66.09 CLASS 2 OBESITY DUE TO EXCESS CALORIES WITHOUT SERIOUS COMORBIDITY WITH BODY MASS INDEX (BMI) OF 38.0 TO 38.9 IN ADULT: ICD-10-CM

## 2024-04-01 DIAGNOSIS — D50.8 IRON DEFICIENCY ANEMIA SECONDARY TO INADEQUATE DIETARY IRON INTAKE: ICD-10-CM

## 2024-04-01 PROBLEM — E66.812 CLASS 2 OBESITY DUE TO EXCESS CALORIES WITHOUT SERIOUS COMORBIDITY WITH BODY MASS INDEX (BMI) OF 38.0 TO 38.9 IN ADULT: Status: ACTIVE | Noted: 2024-04-01

## 2024-04-01 NOTE — ASSESSMENT & PLAN NOTE
Patient's (Body mass index is 38.44 kg/m².) indicates that they are morbidly/severely obese (BMI > 40 or > 35 with obesity - related health condition) with health conditions that include none . Weight is unchanged. BMI  is above average; BMI management plan is completed. We discussed portion control and increasing exercise. Obtain labs to rule out organic cause - A1c, tsh

## 2024-04-01 NOTE — ASSESSMENT & PLAN NOTE
Advised ambulatory blood pressure monitoring about 4-5x/week about 2x/day and call clinic after 2 weeks with blood pressure readings. Proceed to ER if with BP >/=180/110 and/or symptoms of headache, chest pain, blurring of vision or difficulty breathing. Close follow up in 3 months

## 2024-04-01 NOTE — PROGRESS NOTES
Office Note     Name: Sayda Tinsley    : 1990     MRN: [id number]     Chief Complaint  Establish Care (Previous PCP (Kristine Zepeda))    Subjective     History of Present Illness:  Sayda Tinsley is a 33 y.o. female who presents today for chronic conditions    High blood pressure: history of gestational HTN  Anxiety: occasional sleep disturbance, random thoughts  Weight gain: was previously on phentermine, would like something to help weight gain  Hx of anemia during pregnancy  LILY was previously on iron tablets, currently not taking any    Pap smear: due    Past Medical History:   Past Medical History:   Diagnosis Date    Anemia     Anxiety     Asthma     Hypertension     PMS (premenstrual syndrome)     Pregnancy 10/02/2023       Past Surgical History:   Past Surgical History:   Procedure Laterality Date     SECTION  21     SECTION WITH TUBAL N/A 2023    Procedure:  SECTION REPEAT WITH TUBAL;  Surgeon: Dagoberto Castillo MD;  Location: [facility];  Service: Obstetrics/Gynecology;  Laterality: N/A;    EAR TUBES      WISDOM TOOTH EXTRACTION         Immunizations:   Immunization History   Administered Date(s) Administered    Fluzone (or Fluarix & Flulaval for VFC) >6mos 2023    Tdap 2023        Medications:   No current outpatient medications on file.    Allergies:   Allergies   Allergen Reactions    Penicillins Hives       Family History:   Family History   Problem Relation Age of Onset    Coronary artery disease Father     Hypertension Father     Diabetes Sister         Pre-Diabetic    Diabetes Maternal Grandfather     Melanoma Paternal Grandmother     Osteoporosis Paternal Grandmother     Hypertension Paternal Grandmother        Social History:   Social History     Socioeconomic History    Marital status:      Spouse name: aman    Number of children: 1    Highest education level: High school graduate   Tobacco Use     "Smoking status: Former     Current packs/day: 0.00     Average packs/day: 0.3 packs/day for 15.0 years (3.8 ttl pk-yrs)     Types: Cigarettes     Start date: 3/17/2008     Quit date: 3/17/2023     Years since quittin.0    Smokeless tobacco: Never    Tobacco comments:     Not sure on start date   Vaping Use    Vaping status: Never Used   Substance and Sexual Activity    Alcohol use: Not Currently     Comment: Only social events    Drug use: Never    Sexual activity: Yes     Partners: Male     Birth control/protection: Condom       Health Maintenance   Topic Date Due    ANNUAL PHYSICAL  Never done    PAP SMEAR  Never done    COVID-19 Vaccine (2023- season) 2025 (Originally 2023)    INFLUENZA VACCINE  2024    BMI FOLLOWUP  2025    TDAP/TD VACCINES (2 - Td or Tdap) 2033    HEPATITIS C SCREENING  Completed    Pneumococcal Vaccine 0-64  Aged Out       Objective     Vital Signs  BP (!) 152/104   Pulse 70   Resp 16   Ht 165.1 cm (65\")   Wt 105 kg (231 lb)   SpO2 99%   BMI 38.44 kg/m²   Estimated body mass index is 38.44 kg/m² as calculated from the following:    Height as of this encounter: 165.1 cm (65\").    Weight as of this encounter: 105 kg (231 lb).    Class 2 Severe Obesity (BMI >=35 and <=39.9). Obesity-related health conditions include the following: none. Obesity is unchanged. BMI is is above average; BMI management plan is completed. We discussed portion control and increasing exercise.      Physical Exam  Vitals and nursing note reviewed.   Constitutional:       Appearance: Normal appearance. She is obese.   HENT:      Head: Normocephalic and atraumatic.   Cardiovascular:      Rate and Rhythm: Normal rate and regular rhythm.      Pulses: Normal pulses.      Heart sounds: Normal heart sounds.   Pulmonary:      Effort: Pulmonary effort is normal. No respiratory distress.      Breath sounds: Normal breath sounds. No wheezing, rhonchi or rales.   Abdominal:      General: " Abdomen is flat. Bowel sounds are normal.      Palpations: Abdomen is soft.      Tenderness: There is no abdominal tenderness. There is no guarding.   Musculoskeletal:      Cervical back: Neck supple.   Skin:     General: Skin is warm.      Capillary Refill: Capillary refill takes less than 2 seconds.   Neurological:      General: No focal deficit present.      Mental Status: She is alert. Mental status is at baseline.   Psychiatric:         Mood and Affect: Mood normal.         Behavior: Behavior normal.          Procedures     Assessment and Plan     Diagnoses and all orders for this visit:    1. Hypertension, unspecified type (Primary)  Assessment & Plan:  Advised ambulatory blood pressure monitoring about 4-5x/week about 2x/day and call clinic after 2 weeks with blood pressure readings. Proceed to ER if with BP >/=180/110 and/or symptoms of headache, chest pain, blurring of vision or difficulty breathing. Close follow up in 3 months      Orders:  -     CBC & Differential  -     Comprehensive Metabolic Panel  -     Hemoglobin A1c  -     Lipid Panel  -     TSH Rfx On Abnormal To Free T4    2. Class 2 obesity due to excess calories without serious comorbidity with body mass index (BMI) of 38.0 to 38.9 in adult  Assessment & Plan:  Patient's (Body mass index is 38.44 kg/m².) indicates that they are morbidly/severely obese (BMI > 40 or > 35 with obesity - related health condition) with health conditions that include none . Weight is unchanged. BMI  is above average; BMI management plan is completed. We discussed portion control and increasing exercise. Obtain labs to rule out organic cause - A1c, tsh    Orders:  -     CBC & Differential  -     Comprehensive Metabolic Panel  -     Hemoglobin A1c  -     Lipid Panel  -     TSH Rfx On Abnormal To Free T4    3. Iron deficiency anemia secondary to inadequate dietary iron intake  Assessment & Plan:  Have labs done    Orders:  -     Ferritin  -     Folate  -     Iron  Profile  -     Reticulocytes         Counseling was given to patient for the following topics: instructions for management, risks and benefits of treatment options, and importance of treatment compliance.    Follow Up  Return in about 3 months (around 7/1/2024) for Annual with pap.    MD ELADIO Hodges [facility]  [address] [id number] [phone]

## 2024-04-22 ENCOUNTER — TELEPHONE (OUTPATIENT)
Dept: INTERNAL MEDICINE | Facility: CLINIC | Age: 34
End: 2024-04-22

## 2024-04-22 NOTE — TELEPHONE ENCOUNTER
Contacted patient and informed her that Dr. Holder was out of office and if she wanted an antibiotic, she would have to be seen in person. Patient scheduled for an acute visit Wednesday

## 2024-04-22 NOTE — TELEPHONE ENCOUNTER
Caller: Sayda Tinsley    Relationship: Self    Best call back number: [phone]    What medication are you requesting:     What are your current symptoms: FREQUENCY AND BURNING    How long have you been experiencing symptoms: THIS MORNING    Have you had these symptoms before:    [] Yes  [x] No    Have you been treated for these symptoms before:   [] Yes  [x] No    If a prescription is needed, what is your preferred pharmacy and phone number: [facility] [address] [place] [address] [phone] [facility] [phone] FX     Additional notes: PATIENT STATES THAT SHE IS 99% SURE SHE HAS A UTI

## 2024-07-01 ENCOUNTER — TELEPHONE (OUTPATIENT)
Dept: INTERNAL MEDICINE | Facility: CLINIC | Age: 34
End: 2024-07-01

## 2024-07-01 NOTE — TELEPHONE ENCOUNTER
Caller: Sayda Tinsley    Relationship: Self    Best call back number: [phone]     What orders are you requesting (i.e. lab or imaging): BLOOD ORDER    In what timeframe would the patient need to come in: BEFORE APPOINTMENT- HOPEFULLY MONDAY MORNING       Additional notes: PLEASE CALL PATIENT AND LET HER KNOW WHE ORDERS ARE PLACED. SHE OIS WANTING TO COME IN MONDAY MORNING TO HAVE THEM DONE.

## 2024-07-08 LAB
ALBUMIN SERPL-MCNC: 4.6 G/DL (ref 3.5–5.2)
ALBUMIN/GLOB SERPL: 1.9 G/DL
ALP SERPL-CCNC: 89 U/L (ref 39–117)
ALT SERPL-CCNC: 29 U/L (ref 1–33)
AST SERPL-CCNC: 24 U/L (ref 1–32)
BASOPHILS # BLD AUTO: ABNORMAL 10*3/UL
BILIRUB SERPL-MCNC: 0.6 MG/DL (ref 0–1.2)
BUN SERPL-MCNC: 10 MG/DL (ref 6–20)
BUN/CREAT SERPL: 12.3 (ref 7–25)
CALCIUM SERPL-MCNC: 9.9 MG/DL (ref 8.6–10.5)
CHLORIDE SERPL-SCNC: 107 MMOL/L (ref 98–107)
CHOLEST SERPL-MCNC: 171 MG/DL (ref 0–200)
CO2 SERPL-SCNC: 24.5 MMOL/L (ref 22–29)
CREAT SERPL-MCNC: 0.81 MG/DL (ref 0.57–1)
DIFFERENTIAL COMMENT: ABNORMAL
EGFRCR SERPLBLD CKD-EPI 2021: 99.1 ML/MIN/1.73
EOSINOPHIL # BLD AUTO: ABNORMAL 10*3/UL
EOSINOPHIL NFR BLD AUTO: ABNORMAL %
ERYTHROCYTE [DISTWIDTH] IN BLOOD BY AUTOMATED COUNT: 18 % (ref 12.3–15.4)
FERRITIN SERPL-MCNC: 20.8 NG/ML (ref 13–150)
FOLATE SERPL-MCNC: 2.64 NG/ML (ref 4.78–24.2)
GLOBULIN SER CALC-MCNC: 2.4 GM/DL
GLUCOSE SERPL-MCNC: 92 MG/DL (ref 65–99)
HBA1C MFR BLD: 5 % (ref 4.8–5.6)
HCT VFR BLD AUTO: 37.2 % (ref 34–46.6)
HDLC SERPL-MCNC: 58 MG/DL (ref 40–60)
HGB BLD-MCNC: 11.4 G/DL (ref 12–15.9)
IRON SATN MFR SERPL: 37 % (ref 20–50)
IRON SERPL-MCNC: 149 MCG/DL (ref 37–145)
LDLC SERPL CALC-MCNC: 92 MG/DL (ref 0–100)
LYMPHOCYTES # BLD AUTO: ABNORMAL 10*3/UL
LYMPHOCYTES # BLD MANUAL: 2.52 10*3/MM3 (ref 0.7–3.1)
LYMPHOCYTES NFR BLD AUTO: ABNORMAL %
LYMPHOCYTES NFR BLD MANUAL: 38.4 % (ref 19.6–45.3)
MCH RBC QN AUTO: 21.2 PG (ref 26.6–33)
MCHC RBC AUTO-ENTMCNC: 30.6 G/DL (ref 31.5–35.7)
MCV RBC AUTO: 69.1 FL (ref 79–97)
MONOCYTES # BLD MANUAL: 0.2 10*3/MM3 (ref 0.1–0.9)
MONOCYTES NFR BLD AUTO: ABNORMAL %
MONOCYTES NFR BLD MANUAL: 3 % (ref 5–12)
NEUTROPHILS # BLD MANUAL: 3.84 10*3/MM3 (ref 1.7–7)
NEUTROPHILS NFR BLD AUTO: ABNORMAL %
NEUTROPHILS NFR BLD MANUAL: 58.6 % (ref 42.7–76)
PLATELET # BLD AUTO: 238 10*3/MM3 (ref 140–450)
PLATELET BLD QL SMEAR: ABNORMAL
POTASSIUM SERPL-SCNC: 4.4 MMOL/L (ref 3.5–5.2)
PROT SERPL-MCNC: 7 G/DL (ref 6–8.5)
RBC # BLD AUTO: 5.38 10*6/MM3 (ref 3.77–5.28)
RBC MORPH BLD: ABNORMAL
RETICS/RBC NFR AUTO: 1.71 % (ref 0.7–1.9)
SODIUM SERPL-SCNC: 141 MMOL/L (ref 136–145)
TIBC SERPL-MCNC: 399 MCG/DL
TRIGL SERPL-MCNC: 116 MG/DL (ref 0–150)
TSH SERPL DL<=0.005 MIU/L-ACNC: 2.4 UIU/ML (ref 0.27–4.2)
UIBC SERPL-MCNC: 250 MCG/DL (ref 112–346)
VLDLC SERPL CALC-MCNC: 21 MG/DL (ref 5–40)
WBC # BLD AUTO: 6.56 10*3/MM3 (ref 3.4–10.8)

## 2024-07-09 ENCOUNTER — OFFICE VISIT (OUTPATIENT)
Dept: INTERNAL MEDICINE | Facility: CLINIC | Age: 34
End: 2024-07-09
Payer: COMMERCIAL

## 2024-07-09 VITALS
BODY MASS INDEX: 39.32 KG/M2 | DIASTOLIC BLOOD PRESSURE: 94 MMHG | HEART RATE: 56 BPM | HEIGHT: 65 IN | WEIGHT: 236 LBS | SYSTOLIC BLOOD PRESSURE: 130 MMHG | OXYGEN SATURATION: 100 % | RESPIRATION RATE: 16 BRPM

## 2024-07-09 DIAGNOSIS — Z12.4 CERVICAL CANCER SCREENING: ICD-10-CM

## 2024-07-09 DIAGNOSIS — E53.8 LOW FOLATE: ICD-10-CM

## 2024-07-09 DIAGNOSIS — D50.9 MICROCYTIC ANEMIA: Primary | ICD-10-CM

## 2024-07-09 DIAGNOSIS — E66.09 CLASS 2 OBESITY DUE TO EXCESS CALORIES WITHOUT SERIOUS COMORBIDITY WITH BODY MASS INDEX (BMI) OF 39.0 TO 39.9 IN ADULT: ICD-10-CM

## 2024-07-09 DIAGNOSIS — I10 HYPERTENSION, UNSPECIFIED TYPE: ICD-10-CM

## 2024-07-09 DIAGNOSIS — D50.8 IRON DEFICIENCY ANEMIA SECONDARY TO INADEQUATE DIETARY IRON INTAKE: ICD-10-CM

## 2024-07-09 PROBLEM — E66.812 CLASS 2 OBESITY DUE TO EXCESS CALORIES WITHOUT SERIOUS COMORBIDITY WITH BODY MASS INDEX (BMI) OF 38.0 TO 38.9 IN ADULT: Status: RESOLVED | Noted: 2024-04-01 | Resolved: 2024-07-09

## 2024-07-09 PROCEDURE — 99213 OFFICE O/P EST LOW 20 MIN: CPT | Performed by: STUDENT IN AN ORGANIZED HEALTH CARE EDUCATION/TRAINING PROGRAM

## 2024-07-09 PROCEDURE — 99395 PREV VISIT EST AGE 18-39: CPT | Performed by: STUDENT IN AN ORGANIZED HEALTH CARE EDUCATION/TRAINING PROGRAM

## 2024-07-09 NOTE — ASSESSMENT & PLAN NOTE
Obtain B12, homocystine and methylmalonic acid  Repeat CBC  Differentials include folate deficiency, B12 deficiency versus iron deficiency.  However folate deficiency and B12 deficiency usually cause macrocytic anemia however patient has microcytic anemia.  Patient does have a history of being a carrier of beta thalassemia.  If persistent, consider referral to hematology

## 2024-07-09 NOTE — ASSESSMENT & PLAN NOTE
Not on any iron medications right now  Obtain vitamin B12, methylmalonic acid and homocystine levels

## 2024-07-09 NOTE — ASSESSMENT & PLAN NOTE
Patient's (Body mass index is 39.27 kg/m².) indicates that they are morbidly/severely obese (BMI > 40 or > 35 with obesity - related health condition) with health conditions that include none . Weight is unchanged. BMI  is above average; BMI management plan is completed. We discussed portion control and increasing exercise.

## 2024-07-09 NOTE — PROGRESS NOTES
Office Note     Name: Sayda Tinsley    : 1990     MRN: [id number]     Chief Complaint  Annual Exam (Physical with pap)    Subjective     History of Present Illness:  Sayda Tinsley is a 34 y.o. female who presents today for chronic conditions    High blood pressure: history of gestational HTN, mildly elevated diastolic today not on meds  Anxiety: occasional sleep disturbance, random thoughts  Weight gain: was previously on phentermine, would like something to help weight gain  LILY was previously on iron tablets, currently not taking any    Pap smear done today     Also noted low folate levels with microcytic anemia currently not on any iron medications at this time    Family History:   Family History   Problem Relation Age of Onset    Coronary artery disease Father     Hypertension Father     Diabetes Sister         Pre-Diabetic    Diabetes Maternal Grandfather     Melanoma Paternal Grandmother     Osteoporosis Paternal Grandmother     Hypertension Paternal Grandmother        Social History:   Social History     Socioeconomic History    Marital status:      Spouse name: aman    Number of children: 1    Highest education level: High school graduate   Tobacco Use    Smoking status: Former     Current packs/day: 0.00     Average packs/day: 0.3 packs/day for 15.0 years (3.8 ttl pk-yrs)     Types: Cigarettes     Start date: 3/17/2008     Quit date: 3/17/2023     Years since quittin.3    Smokeless tobacco: Never    Tobacco comments:     Not sure on start date   Vaping Use    Vaping status: Never Used   Substance and Sexual Activity    Alcohol use: Not Currently     Comment: Only social events    Drug use: Never    Sexual activity: Yes     Partners: Male     Birth control/protection: Condom       Health Maintenance   Topic Date Due    ANNUAL PHYSICAL  Never done    PAP SMEAR  Never done    COVID-19 Vaccine ( season) 2025 (Originally 2023)    INFLUENZA VACCINE  2024    BMI  "FOLLOWUP  07/09/2025    TDAP/TD VACCINES (2 - Td or Tdap) 11/18/2033    HEPATITIS C SCREENING  Completed    Pneumococcal Vaccine 0-64  Aged Out       Objective     Vital Signs  /94   Pulse 56   Resp 16   Ht 165.1 cm (65\")   Wt 107 kg (236 lb)   SpO2 100%   BMI 39.27 kg/m²   Estimated body mass index is 39.27 kg/m² as calculated from the following:    Height as of this encounter: 165.1 cm (65\").    Weight as of this encounter: 107 kg (236 lb).        Physical Exam  Vitals and nursing note reviewed. Exam conducted with a chaperone present.   Constitutional:       Appearance: Normal appearance.   HENT:      Head: Normocephalic and atraumatic.   Eyes:      Extraocular Movements: Extraocular movements intact.      Conjunctiva/sclera: Conjunctivae normal.      Pupils: Pupils are equal, round, and reactive to light.   Cardiovascular:      Rate and Rhythm: Normal rate and regular rhythm.      Pulses: Normal pulses.      Heart sounds: Normal heart sounds.   Pulmonary:      Effort: Pulmonary effort is normal.      Breath sounds: No wheezing, rhonchi or rales.   Abdominal:      General: Abdomen is flat. Bowel sounds are normal.      Palpations: Abdomen is soft.      Tenderness: There is no abdominal tenderness.   Genitourinary:     General: Normal vulva.      Comments: External: No rash, redness or discharge  Cervix: No polyps or masses [facility]  Bimanual: No masses palpated  Musculoskeletal:         General: Normal range of motion.      Cervical back: Normal range of motion and neck supple.   Skin:     General: Skin is warm and dry.      Capillary Refill: Capillary refill takes less than 2 seconds.   Neurological:      General: No focal deficit present.      Mental Status: She is alert and oriented to person, place, and time. Mental status is at baseline.   Psychiatric:         Mood and Affect: Mood normal.         Behavior: Behavior normal. Behavior is cooperative.         Thought Content: Thought content normal. "          Procedures     Assessment and Plan     Diagnoses and all orders for this visit:    1. Microcytic anemia (Primary)  Assessment & Plan:  Not on any iron medications right now  Obtain vitamin B12, methylmalonic acid and homocystine levels    Orders:  -     Vitamin B12  -     Methylmalonic Acid, Serum  -     Homocysteine  -     CBC & Differential    2. Class 2 obesity due to excess calories without serious comorbidity with body mass index (BMI) of 39.0 to 39.9 in adult  Assessment & Plan:  Patient's (Body mass index is 39.27 kg/m².) indicates that they are morbidly/severely obese (BMI > 40 or > 35 with obesity - related health condition) with health conditions that include none . Weight is unchanged. BMI  is above average; BMI management plan is completed. We discussed portion control and increasing exercise.     Orders:  -     Ambulatory Referral to Nutrition Services    3. Hypertension, unspecified type  Assessment & Plan:  Stable, not on medications, decrease sodium intake for now       4. Iron deficiency anemia secondary to inadequate dietary iron intake  Assessment & Plan:  Not on any iron medications at this time, obtain labs      5. Low folate  Assessment & Plan:  Obtain B12, homocystine and methylmalonic acid  Repeat CBC  Differentials include folate deficiency, B12 deficiency versus iron deficiency.  However folate deficiency and B12 deficiency usually cause macrocytic anemia however patient has microcytic anemia.  Patient does have a history of being a carrier of beta thalassemia.  If persistent, consider referral to hematology    Orders:  -     Vitamin B12  -     Methylmalonic Acid, Serum  -     Homocysteine  -     CBC & Differential    6. Cervical cancer screening  -     LIQUID-BASED PAP SMEAR WITH HPV GENOTYPING REGARDLESS OF INTERPRETATION (SHANTANU,COR,MAD); Future         Counseling was given to patient for the following topics: instructions for management, risks and benefits of treatment options,  and importance of treatment compliance.    Follow Up  Return in about 6 months (around 1/9/2025) for 6 month follow up.    MD ELADIO Hodges [facility]  [address] [place] [id number] [phone]

## 2024-07-12 LAB
BASOPHILS # BLD AUTO: 0 X10E3/UL (ref 0–0.2)
BASOPHILS NFR BLD AUTO: 0 %
EOSINOPHIL # BLD AUTO: 0.2 X10E3/UL (ref 0–0.4)
EOSINOPHIL NFR BLD AUTO: 3 %
ERYTHROCYTE [DISTWIDTH] IN BLOOD BY AUTOMATED COUNT: 17.4 % (ref 11.7–15.4)
HCT VFR BLD AUTO: 34.8 % (ref 34–46.6)
HCYS SERPL-SCNC: 9.6 UMOL/L (ref 0–14.5)
HGB BLD-MCNC: 11 G/DL (ref 11.1–15.9)
IMM GRANULOCYTES # BLD AUTO: 0 X10E3/UL (ref 0–0.1)
IMM GRANULOCYTES NFR BLD AUTO: 0 %
LYMPHOCYTES # BLD AUTO: 2.3 X10E3/UL (ref 0.7–3.1)
LYMPHOCYTES NFR BLD AUTO: 29 %
MCH RBC QN AUTO: 21.3 PG (ref 26.6–33)
MCHC RBC AUTO-ENTMCNC: 31.6 G/DL (ref 31.5–35.7)
MCV RBC AUTO: 67 FL (ref 79–97)
METHYLMALONATE SERPL-SCNC: 150 NMOL/L (ref 0–378)
MONOCYTES # BLD AUTO: 0.5 X10E3/UL (ref 0.1–0.9)
MONOCYTES NFR BLD AUTO: 6 %
NEUTROPHILS # BLD AUTO: 4.9 X10E3/UL (ref 1.4–7)
NEUTROPHILS NFR BLD AUTO: 62 %
PLATELET # BLD AUTO: 281 X10E3/UL (ref 150–450)
RBC # BLD AUTO: 5.16 X10E6/UL (ref 3.77–5.28)
VIT B12 SERPL-MCNC: 332 PG/ML (ref 232–1245)
WBC # BLD AUTO: 8.1 X10E3/UL (ref 3.4–10.8)

## 2024-07-15 ENCOUNTER — TELEPHONE (OUTPATIENT)
Dept: INTERNAL MEDICINE | Facility: CLINIC | Age: 34
End: 2024-07-15
Payer: COMMERCIAL

## 2024-07-15 DIAGNOSIS — I10 HYPERTENSION, UNSPECIFIED TYPE: Primary | ICD-10-CM

## 2024-07-15 DIAGNOSIS — D50.8 IRON DEFICIENCY ANEMIA SECONDARY TO INADEQUATE DIETARY IRON INTAKE: ICD-10-CM

## 2024-07-15 LAB — REF LAB TEST METHOD: NORMAL

## 2024-07-15 RX ORDER — FERROUS GLUCONATE 324(38)MG
324 TABLET ORAL
Qty: 90 TABLET | Refills: 1 | Status: SHIPPED | OUTPATIENT
Start: 2024-07-15

## 2024-07-15 NOTE — TELEPHONE ENCOUNTER
----- Message from Nicky LUZ sent at 7/15/2024 11:37 AM EDT -----    ----- Message -----  From: Shanna Holder MD  Sent: 7/15/2024   9:06 AM EDT  To: Nicky Neely MA    Anemic however all other labs were normal.  Anemia is likely due to iron deficiency anemia.  Restart ferrous gluconate as prescribed.  Repeat labs prior to next visit in January, please schedule

## 2024-10-01 NOTE — ANESTHESIA PREPROCEDURE EVALUATION
Anesthesia Evaluation     Patient summary reviewed and Nursing notes reviewed   NPO Solid Status: > 8 hours  NPO Liquid Status: > 4 hours           Airway   Mallampati: II  TM distance: >3 FB  Neck ROM: full  Possible difficult intubation  Dental - normal exam     Pulmonary - normal exam   (+) a smoker Former, asthma,  Cardiovascular - normal exam    (+) hypertension      Neuro/Psych  (+) psychiatric history Anxiety  GI/Hepatic/Renal/Endo - negative ROS     Musculoskeletal (-) negative ROS    Abdominal    Substance History - negative use     OB/GYN    (+) Pregnant        Other   blood dyscrasia anemia,     ROS/Med Hx Other: Labs reviewed                  Anesthesia Plan    ASA 2     spinal     (Risks of spinal anesthesia discussed , including but not limited to:  Headache, itching, nausea and vomiting, infection, failure of the block, decreased BP, permanent chronic back pain, nerve damage, paralysis, etc.  )    Anesthetic plan, risks, benefits, and alternatives have been provided, discussed and informed consent has been obtained with: patient.  Pre-procedure education provided  Plan discussed with CRNA.    CODE STATUS:    Level Of Support Discussed With: Patient  Code Status (Patient has no pulse and is not breathing): CPR (Attempt to Resuscitate)  Medical Interventions (Patient has pulse or is breathing): Full Support      
Discharged

## 2024-12-28 ENCOUNTER — TRANSCRIBE ORDERS (OUTPATIENT)
Dept: GENERAL RADIOLOGY | Facility: HOSPITAL | Age: 34
End: 2024-12-28
Payer: COMMERCIAL

## 2024-12-28 ENCOUNTER — HOSPITAL ENCOUNTER (OUTPATIENT)
Dept: GENERAL RADIOLOGY | Facility: HOSPITAL | Age: 34
Discharge: HOME | End: 2024-12-28
Payer: COMMERCIAL

## 2024-12-28 DIAGNOSIS — M54.50 LOW BACK PAIN, UNSPECIFIED BACK PAIN LATERALITY, UNSPECIFIED CHRONICITY, UNSPECIFIED WHETHER SCIATICA PRESENT: Primary | ICD-10-CM

## 2024-12-28 DIAGNOSIS — M54.50 LOW BACK PAIN, UNSPECIFIED BACK PAIN LATERALITY, UNSPECIFIED CHRONICITY, UNSPECIFIED WHETHER SCIATICA PRESENT: ICD-10-CM

## 2024-12-28 PROCEDURE — 72110 X-RAY EXAM L-2 SPINE 4/>VWS: CPT

## 2025-01-08 ENCOUNTER — PATIENT MESSAGE (OUTPATIENT)
Dept: INTERNAL MEDICINE | Facility: CLINIC | Age: 35
End: 2025-01-08

## 2025-01-10 ENCOUNTER — TELEPHONE (OUTPATIENT)
Dept: INTERNAL MEDICINE | Facility: CLINIC | Age: 35
End: 2025-01-10
Payer: COMMERCIAL

## 2025-01-10 NOTE — TELEPHONE ENCOUNTER
"Relay     \"Left a vm for patient to call back to reschedule due to office is closed 1/11/25 due to weather \"          "

## 2025-01-18 ENCOUNTER — OFFICE VISIT (OUTPATIENT)
Dept: INTERNAL MEDICINE | Facility: CLINIC | Age: 35
End: 2025-01-18
Payer: COMMERCIAL

## 2025-01-18 VITALS
WEIGHT: 232 LBS | SYSTOLIC BLOOD PRESSURE: 126 MMHG | OXYGEN SATURATION: 99 % | HEART RATE: 105 BPM | HEIGHT: 65 IN | RESPIRATION RATE: 16 BRPM | DIASTOLIC BLOOD PRESSURE: 69 MMHG | TEMPERATURE: 97.3 F | BODY MASS INDEX: 38.65 KG/M2

## 2025-01-18 DIAGNOSIS — M54.50 PAIN IN LEFT LUMBAR REGION OF BACK: Primary | ICD-10-CM

## 2025-01-18 PROCEDURE — 99214 OFFICE O/P EST MOD 30 MIN: CPT | Performed by: INTERNAL MEDICINE

## 2025-01-18 RX ORDER — MELOXICAM 15 MG/1
15 TABLET ORAL DAILY
Qty: 30 TABLET | Refills: 0 | Status: SHIPPED | OUTPATIENT
Start: 2025-01-18

## 2025-01-18 NOTE — PROGRESS NOTES
Chief Complaint   Patient presents with    Back Pain     Lower, left side-Started before before [place]. Went to [facility] and was given steroids--little relief but not much.       Subjective     History of Present Illness  The patient is a 34-year-old female presenting for concerns of back pain.    She sought urgent care a few weeks prior due to severe back pain, which was managed with a steroid injection and a 5-day course of oral steroids. An x-ray was also conducted at that time. The steroid treatment provided some relief, improving her mobility, but the pain persisted. She experiences morning stiffness, which gradually subsides as she regains movement. Her occupation involves prolonged sitting, necessitating frequent breaks to walk around. The pain is localized in the mid-back, slightly more towards the left side, and does not radiate to her lower extremities. She reports a jerking sensation during coughing or sneezing. She has a history of back strain, typically resolving within a week with ice and steroid treatment. However, her current symptoms have not responded to these interventions. Over-the-counter analgesics such as Tylenol and Aleve have been ineffective, and heat application exacerbated her discomfort. She has not previously undergone chiropractic treatment or physical therapy. Her sleep is generally unaffected by the pain, and she does not experience muscle tightness. Activities such as driving, sitting, using the restroom, and bending exacerbate her discomfort.    MEDICATIONS  Current: Tylenol, Aleve, ibuprofen    The following portions of the patient's history were reviewed and updated as appropriate: allergies, current medications, past family history, past medical history, past social history, past surgical history and problem list.    Review of Systems   Constitutional:  Negative for fever.   Cardiovascular:  Negative for chest pain.   Gastrointestinal:  Negative for abdominal pain.    Genitourinary:  Negative for dysuria and pelvic pain.   Musculoskeletal:  Positive for back pain.   Neurological:  Negative for weakness and numbness.       Allergies   Allergen Reactions    Penicillins Hives       Past Medical History:   Diagnosis Date    Anemia     Anxiety     Asthma     Hypertension     PMS (premenstrual syndrome)     Pregnancy 10/02/2023       Social History     Socioeconomic History    Marital status:      Spouse name: aman    Number of children: 1    Highest education level: High school graduate   Tobacco Use    Smoking status: Former     Current packs/day: 0.00     Average packs/day: 0.3 packs/day for 15.0 years (3.8 ttl pk-yrs)     Types: Cigarettes     Start date: 3/17/2008     Quit date: 3/17/2023     Years since quittin.8     Passive exposure: Past    Smokeless tobacco: Never    Tobacco comments:     Not sure on start date   Vaping Use    Vaping status: Never Used   Substance and Sexual Activity    Alcohol use: Not Currently     Comment: Only social events    Drug use: Never    Sexual activity: Yes     Partners: Male     Birth control/protection: Condom        Past Surgical History:   Procedure Laterality Date     SECTION  21     SECTION WITH TUBAL N/A 2023    Procedure:  SECTION REPEAT WITH TUBAL;  Surgeon: Dagoberto Castillo MD;  Location: [facility];  Service: Obstetrics/Gynecology;  Laterality: N/A;    EAR TUBES      WISDOM TOOTH EXTRACTION         Family History   Problem Relation Age of Onset    Coronary artery disease Father     Hypertension Father     Diabetes Sister         Pre-Diabetic    Diabetes Maternal Grandfather     Melanoma Paternal Grandmother     Osteoporosis Paternal Grandmother     Hypertension Paternal Grandmother          Current Outpatient Medications:     meloxicam (MOBIC) 15 MG tablet, Take 1 tablet by mouth Daily., Disp: 30 tablet, Rfl: 0    Objective   /69   Pulse 105   Temp 97.3 °F  "(36.3 °C) (Tympanic)   Resp 16   Ht 165.1 cm (65\")   Wt 105 kg (232 lb)   SpO2 99%   BMI 38.61 kg/m²     Physical Exam  Vitals and nursing note reviewed.   Constitutional:       Appearance: Normal appearance. She is well-developed.   HENT:      Head: Normocephalic and atraumatic.   Eyes:      Extraocular Movements: Extraocular movements intact.      Conjunctiva/sclera: Conjunctivae normal.   Pulmonary:      Effort: Pulmonary effort is normal.   Musculoskeletal:      Cervical back: Normal range of motion and neck supple.      Comments: Pain in lower back with right rotation   Skin:     General: Skin is warm and dry.      Findings: No rash.   Neurological:      General: No focal deficit present.      Mental Status: She is alert and oriented to person, place, and time.   Psychiatric:         Mood and Affect: Mood normal.         Behavior: Behavior normal.         Results:  Results for orders placed or performed in visit on 24   LIQUID-BASED PAP SMEAR WITH HPV GENOTYPING REGARDLESS OF INTERPRETATION (SHANTANU,COR,MAD)    Collection Time: 24  4:14 PM    Specimen: Cervix, Endocervix; ThinPrep Vial   Result Value Ref Range    Reference Lab Report       [facility]  [address] [place] [address]  Phone: [phone] or [phone]  Fax: [phone]  Nino Bernard M.D., Medical Director    PATIENT NAME                           LABORATORY NO.  TAJ LÓPEZ                          [id number]  [id number]                         AGE              SEX  SSN           CLIENT REF #  [facility] [place] [address]      1990  F    [id number]   [id number]    [address] [place] [address] [address]              REQUESTING M.D.     ATTENDING [facility] [address]                 VINCENZO CLIFFORD  DATE COLLECTED      DATE RECEIVED      DATE REPORTED  2024          2024         07/15/2024    ThinPrep Pap with [facility]    DIAGNOSIS:  Negative for " intraepithelial lesion or malignancy    Multiple factors can influence accuracy of Pap tests; therefore, screening at  regular intervals is necessary for early cancer detection.    COMMENT:  Benign cellular changes associated  with reactive/reparative changes  are present.  Professional interpretation rendered by Nino Bernard M.D., RHETT at  [facility], [address] [place] [address].  SPECIMEN ADEQUACY:  SATISFACTORY FOR EVALUATION  Transformation zone is present.  SOURCE OF SPECIMEN:  CERVICAL/ENDOCERVICAL  SLIDES:  1  CLINICAL HISTORY:  Cervical cancer screening    HPV  HR-HPV POOL: Positive    The Aptima HPV assay is an in vitro nucleic acid amplification test for the  qualitative detection of E6/E7 viral messenger RNA from 14 high risk types of  HPV in cervical specimens. The high risk HPV types detected include: 16, 18,  31, 33, 35, 39, 45, 51, 52, 56, 58, 59, 66, 68    HPV Genotyping  HPV 16: Negative  HPV 18/45: Negative    The Aptima HPV 16, 18/45 genotype assay is an in vitro nucleic acid  amplification test for the qualitative detection of E6/E7 viral messenger RNA of  human papillomavirus (HPV) types 16,18/45 in cervical specimens from women  with Aptima HPV positive results.  The Aptima HPV 16, 18/45 genotype assay  can differentiate HPV 16 from HPV 18 and/or HPV 45, but does not  differentiate between HPV 18 and HPV 45.    CYTOTECHNOLOGIST:      VARSHA MURILLO (ASCP)                            REVIEWED, DIAGNOSED AND  ELECTRONICALLY SIGNED BY:      Nino Bernard M.D., RYLEE.[email or web address].    CPT CODES:  09105, 49182, 57803, 43002     Vitamin B12    Collection Time: 07/09/24  4:19 PM    Specimen: Blood   Result Value Ref Range    Vitamin B-12 332 232 - 1,245 pg/mL   Methylmalonic Acid, Serum    Collection Time: 07/09/24  4:19 PM    Specimen: Blood   Result Value Ref Range    Methylmalonic Acid 150 0 - 378 nmol/L   Homocysteine    Collection Time: 07/09/24  4:19 PM    Specimen: Blood   Result Value  Ref Range    Homocystine, Plasma (Quant) 9.6 0.0 - 14.5 umol/L   CBC & Differential    Collection Time: 07/09/24  4:19 PM   Result Value Ref Range    WBC 8.1 3.4 - 10.8 x10E3/uL    RBC 5.16 3.77 - 5.28 x10E6/uL    Hemoglobin 11.0 (L) 11.1 - 15.9 g/dL    Hematocrit 34.8 34.0 - 46.6 %    MCV 67 (L) 79 - 97 fL    MCH 21.3 (L) 26.6 - 33.0 pg    MCHC 31.6 31.5 - 35.7 g/dL    RDW 17.4 (H) 11.7 - 15.4 %    Platelets 281 150 - 450 x10E3/uL    Neutrophil Rel % 62 Not Estab. %    Lymphocyte Rel % 29 Not Estab. %    Monocyte Rel % 6 Not Estab. %    Eosinophil Rel % 3 Not Estab. %    Basophil Rel % 0 Not Estab. %    Neutrophils Absolute 4.9 1.4 - 7.0 x10E3/uL    Lymphocytes Absolute 2.3 0.7 - 3.1 x10E3/uL    Monocytes Absolute 0.5 0.1 - 0.9 x10E3/uL    Eosinophils Absolute 0.2 0.0 - 0.4 x10E3/uL    Basophils Absolute 0.0 0.0 - 0.2 x10E3/uL    Immature Granulocyte Rel % 0 Not Estab. %    Immature Grans Absolute 0.0 0.0 - 0.1 x10E3/uL         Assessment & Plan   Diagnoses and all orders for this visit:    1. Pain in left lumbar region of back (Primary)  -     Ambulatory Referral to Physical Therapy for Evaluation & Treatment  -     meloxicam (MOBIC) 15 MG tablet; Take 1 tablet by mouth Daily.  Dispense: 30 tablet; Refill: 0        Assessment & Plan  Acute lumbar back pain.  The patient's back pain is likely due to mild intervertebral height loss at L5-S1, which is a common site for the onset of arthritis. The pain is exacerbated by activities such as sitting, driving, and bending, and is particularly severe in the mornings.   - She was advised to engage in weight loss exercises to alleviate the pressure on her back.   - A prescription for meloxicam 15 mg, to be taken once daily, was provided. She was instructed to take this medication for the next 3 to 4 days, after which it can be taken as needed. She was also advised against concurrent use of Aleve while on meloxicam. - A referral for physical therapy was made, and she was  encouraged to contact the physical therapy office directly to expedite the appointment scheduling process.    Follow up:  No follow-ups on file.     Patient or patient representative verbalized consent for the use of Ambient Listening during the visit with  Shane Cook DO for chart documentation. 1/18/2025  10:45 EST  Answers submitted by the patient for this visit:  Primary Reason for Visit (Submitted on 1/16/2025)  What is the primary reason for your visit?: Back Pain  Back Pain Questionnaire (Submitted on 1/16/2025)  Chief Complaint: Back pain  Chronicity: recurrent  Onset: 1 to 4 weeks ago  Frequency: constantly  Progression since onset: unchanged  Pain location: lumbar spine  Pain quality: cramping, shooting, stabbing  Radiates to: left buttock  Pain - numeric: 7/10  Pain is: the same all the time  Aggravated by: coughing, position, lying down, sitting, standing  Stiffness is present: in the morning  bladder incontinence: No  bowel incontinence: No  leg pain: No  paresthesias: No  perianal numbness: No  tingling: No  weight loss: No  Risk factors: lack of exercise, obesity  Additional Information: Thrown out my back before- this is different, pain is constant

## 2025-02-18 ENCOUNTER — TREATMENT (OUTPATIENT)
Dept: PHYSICAL THERAPY | Facility: CLINIC | Age: 35
End: 2025-02-18
Payer: COMMERCIAL

## 2025-02-18 DIAGNOSIS — M54.50 ACUTE LEFT-SIDED LOW BACK PAIN WITHOUT SCIATICA: Primary | ICD-10-CM

## 2025-02-18 PROCEDURE — 97161 PT EVAL LOW COMPLEX 20 MIN: CPT | Performed by: PHYSICAL THERAPIST

## 2025-02-18 PROCEDURE — 97110 THERAPEUTIC EXERCISES: CPT | Performed by: PHYSICAL THERAPIST

## 2025-02-18 PROCEDURE — 97112 NEUROMUSCULAR REEDUCATION: CPT | Performed by: PHYSICAL THERAPIST

## 2025-02-18 NOTE — PROGRESS NOTES
Physical Therapy Initial Evaluation and Plan of Care  [address]    [place] [address]  [phone]    Patient: Sayda Tinsley   : 1990  Diagnosis/ICD-10 Code:  Acute left-sided low back pain without sciatica [M54.50]  Referring practitioner: Shane Cook DO  Date of Initial Visit: 2025  Today's Date: 2025  Patient seen for 1 session         Visit Diagnoses:    ICD-10-CM ICD-9-CM   1. Acute left-sided low back pain without sciatica  M54.50 724.2         Subjective Questionnaire: Oswestry: =26% impairment       Subjective Evaluation    History of Present Illness  Mechanism of injury: Pt presents w/ complaint of recurrent episodes of low back pn, started soon after her first pregnacncy (approx 3 years ago). Symptoms typically provoked by exertion/awkward movements and resoloved within a few days/weeks. Most recent episode began the Saturday before  after sitting in the floor w/ her kids. Localized to the L side of her lower back. (-) leg involvement. Would be ok sitting for short periods, aggravated by car transfers, getting up from a chair after sitting, twisting. Failed to improve w/ heat, OTC medication, course of steroids. Had XR showing DDD. Then saw PCP who referred to PT, gave prescription sterngth ibuprofen. Pn finally subsided.      Lives w/ spouse and 2 kids, 3 and 2 yo  2 c sections    Subjective comment: L low back pn  Patient Occupation: -heavy desk work, walks as able Quality of life: good    Pain  Current pain ratin  At best pain ratin  At worst pain rating: 10    Social Support  Lives in: multiple-level home    Patient Goals  Patient goal: improve strength, manage symptoms if they return         Treatment  See Exercise, Manual, and Modality Logs for complete treatment.     Access Code: [email or web address]  URL: [email or web address]  Date: 2025  Prepared by: Jackie Mccain    Exercises  - Supine Posterior  "Pelvic Tilt  - 1-2 x daily - 1 sets - 10 reps - 5-10 sec hold  - Supine March with Posterior Pelvic Tilt  - 1-2 x daily - 2-3 sets - 10 reps  - Bridge  - 1-2 x daily - 2-3 sets - 10 reps  - Cat Cow  - 1-2 x daily - 10 reps  - Quadruped Alternating Leg Extensions  - 1-2 x daily - 1-2 sets - 10 reps  - Bird Dog  - 1-2 x daily - 1-2 sets - 10 reps    Objective          Tenderness     Additional Tenderness Details  (-) TTP    Neurological Testing     Sensation     Lumbar   Left   Intact: light touch    Right   Intact: light touch    Active Range of Motion     Additional Active Range of Motion Details  Trunk Flxn WNL w/o pn   Trunk Extn mild L low back \"tightness\"; quadrant (-)  Trunk Rotation WNL w/o pn  Trunk SB WNL w/o pn    B hip IR/ER WNL w/o pn      Strength/Myotome Testing     Lumbar   Left   Normal strength    Right   Normal strength    Tests       Thoracic   Negative slump.     Lumbar     Left   Negative passive SLR and quadrant.     Right   Negative passive SLR and quadrant.     Left Pelvic Girdle/Sacrum   Negative: sacrum compression, gapping and sacral spring.     Right Pelvic Girdle/Sacrum   Negative: sacrum compression, sacral spring and thigh thrust.     Additional Tests Details  SLS no pn  Functional squat full ROM and no pn    Lumbar Flexibility Comments:   Hamstring: no impairment  Quad: no impairment  Hip Flexor: no impairment  Piriformis: no impairment          Assessment & Plan       Assessment  Impairments: impaired physical strength and lacks appropriate home exercise program   Assessment details: Pt is a 34 YOF who presents to PT w/ complaint of recurrent LBP. Recently experienced exacerbation after sitting in the floor with her kids, lasted approx 1.5 months before symptoms finally improved. Symptoms now resolved, but pt would like to start HEP to prevent return of symptoms and learn to manage independently. On examination today pt demo full trunk mobility in all planes, noting slight L low back " discomfort end range extn, otherwise pn free. BLE strength 5/5 and pn free upon testing. (-) SIJ provocation tests, (-) NANDA/FADIR, slump/SLR. She denies leg involvement/paresthesia at any time. She does report hx of 2 sections, sedentary work, and little physical activity beyond caring for her 2 young children. Pt could benefit from core stabilization/strengthening program. Issued HEP. Pt would like to trial independent mgmt and return to clinic if symptoms return or for HEP progression.   Barriers to therapy: n/a  Prognosis: good    Goals  Plan Goals: LTG 6wks  1) Pt to be independent w/ long term HEP and self mgmt.  2) Pt to report no reoccurrence of low back pn  3) Pt to report no pn w/ full trunk extn ROM.  4) Pt to improve Mod Oswestry score to 8/50 or better to reflect improved pn and function.    Plan  Therapy options: will be seen for skilled therapy services  Planned modality interventions: TENS, thermotherapy (hydrocollator packs), traction, ultrasound, cryotherapy and dry needling  Planned therapy interventions: abdominal trunk stabilization, ADL retraining, body mechanics training, flexibility, functional ROM exercises, home exercise program, joint mobilization, manual therapy, neuromuscular re-education, postural training, soft tissue mobilization, spinal/joint mobilization, strengthening, stretching and therapeutic activities  Frequency: 1x week  Duration in weeks: 4  Treatment plan discussed with: patient  Plan details: PT POC to emphasize restoration of pn free trunk mobility, flexibility, core and LE strength, appropriate posture and body mechanics w/ lifting/carrying utilizing TE/TA/NMR/MT and modalities as indicated for pn control.        History # of Personal Factors and/or Comorbidities: LOW (0)  Examination of Body System(s): # of elements: LOW (1-2)  Clinical Presentation: STABLE   Clinical Decision Making: LOW       Timed:         Manual Therapy:    0     mins  79990;     Therapeutic  Exercise:    10     mins  31877;     Neuromuscular Alissa:    10    mins  47783;    Therapeutic Activity:     0     mins  15841;     Gait Trainin     mins  76005;     Ultrasound:     0     mins  80700;    Ionto                               0    mins   21609  Self Care                       0     mins   26489  Work Conditioning 1-2 hours    0    mins 31247  Work Conditioning ea add'l hour    0   mins 03058      Un-Timed:  Electrical Stimulation:    0     mins  06768 ( );  Dry Needling     0     mins self-pay  Traction     0     mins 41246  Low Eval     30     Mins 93135  Mod Eval     0     Mins 75853  High Eval                       0     Mins 21573  Re-Eval     0     Mins 73775  Canalith Repos    0     mins 70303      Timed Treatment:   20   mins   Total Treatment:     50   mins          PT: Jackie Mccain, PT     License Number: [id number]  Electronically signed by Jackie Mccain, PT, 25, 12:52 PM EST    Certification Period: 2025 thru 2025  I certify that the therapy services are furnished while this patient is under my care.  The services outlined above are required by this patient, and will be reviewed every 90 days.         Physician Signature:__________________________________________________    PHYSICIAN: Shane Cook DO  NPI: [id number]                                      DATE:

## 2025-04-29 DIAGNOSIS — M54.50 PAIN IN LEFT LUMBAR REGION OF BACK: ICD-10-CM

## 2025-04-29 RX ORDER — MELOXICAM 15 MG/1
15 TABLET ORAL DAILY
Qty: 90 TABLET | Refills: 0 | Status: SHIPPED | OUTPATIENT
Start: 2025-04-29

## 2025-07-14 ENCOUNTER — OFFICE VISIT (OUTPATIENT)
Dept: INTERNAL MEDICINE | Facility: CLINIC | Age: 35
End: 2025-07-14
Payer: COMMERCIAL

## 2025-07-14 VITALS
SYSTOLIC BLOOD PRESSURE: 146 MMHG | HEIGHT: 65 IN | HEART RATE: 88 BPM | OXYGEN SATURATION: 100 % | WEIGHT: 216 LBS | BODY MASS INDEX: 35.99 KG/M2 | DIASTOLIC BLOOD PRESSURE: 89 MMHG | TEMPERATURE: 98.9 F | RESPIRATION RATE: 16 BRPM

## 2025-07-14 DIAGNOSIS — R03.0 WHITE COAT SYNDROME WITHOUT DIAGNOSIS OF HYPERTENSION: Primary | ICD-10-CM

## 2025-07-14 DIAGNOSIS — M51.9 LUMBAR DISC DISEASE: ICD-10-CM

## 2025-07-14 DIAGNOSIS — Z00.00 ANNUAL PHYSICAL EXAM: ICD-10-CM

## 2025-07-14 DIAGNOSIS — Z12.4 CERVICAL CANCER SCREENING: ICD-10-CM

## 2025-07-14 DIAGNOSIS — M72.2 PLANTAR FASCIITIS: ICD-10-CM

## 2025-07-14 DIAGNOSIS — D50.8 IRON DEFICIENCY ANEMIA SECONDARY TO INADEQUATE DIETARY IRON INTAKE: ICD-10-CM

## 2025-07-14 PROBLEM — D50.9 MICROCYTIC ANEMIA: Status: RESOLVED | Noted: 2024-07-09 | Resolved: 2025-07-14

## 2025-07-14 PROCEDURE — 99213 OFFICE O/P EST LOW 20 MIN: CPT | Performed by: STUDENT IN AN ORGANIZED HEALTH CARE EDUCATION/TRAINING PROGRAM

## 2025-07-14 PROCEDURE — 99395 PREV VISIT EST AGE 18-39: CPT | Performed by: STUDENT IN AN ORGANIZED HEALTH CARE EDUCATION/TRAINING PROGRAM

## 2025-07-14 RX ORDER — MELOXICAM 15 MG/1
15 TABLET ORAL DAILY
Qty: 90 TABLET | Refills: 0 | Status: SHIPPED | OUTPATIENT
Start: 2025-07-14

## 2025-07-14 RX ORDER — PHENTERMINE HYDROCHLORIDE 37.5 MG/1
37.5 CAPSULE ORAL DAILY
COMMUNITY
Start: 2025-06-16

## 2025-07-14 NOTE — ASSESSMENT & PLAN NOTE
XR showed mild disc disease  May continue prn mobic 15mg daily  Completed physical therapy  Performing back exercises at home  Call clinic if with concerns

## 2025-07-15 LAB — REF LAB TEST METHOD: NORMAL

## 2025-07-24 ENCOUNTER — OFFICE VISIT (OUTPATIENT)
Dept: INTERNAL MEDICINE | Facility: CLINIC | Age: 35
End: 2025-07-24
Payer: COMMERCIAL

## 2025-07-24 VITALS
HEART RATE: 99 BPM | SYSTOLIC BLOOD PRESSURE: 162 MMHG | RESPIRATION RATE: 16 BRPM | DIASTOLIC BLOOD PRESSURE: 104 MMHG | WEIGHT: 212 LBS | HEIGHT: 65 IN | TEMPERATURE: 97.8 F | OXYGEN SATURATION: 100 % | BODY MASS INDEX: 35.32 KG/M2

## 2025-07-24 DIAGNOSIS — N75.0 BARTHOLIN CYST: Primary | ICD-10-CM

## 2025-07-24 PROCEDURE — 99214 OFFICE O/P EST MOD 30 MIN: CPT | Performed by: STUDENT IN AN ORGANIZED HEALTH CARE EDUCATION/TRAINING PROGRAM

## 2025-07-24 RX ORDER — SULFAMETHOXAZOLE AND TRIMETHOPRIM 800; 160 MG/1; MG/1
1 TABLET ORAL 2 TIMES DAILY
Qty: 14 TABLET | Refills: 0 | Status: SHIPPED | OUTPATIENT
Start: 2025-07-24 | End: 2025-07-31

## 2025-07-24 NOTE — PROGRESS NOTES
Office Note     Name: Sayda Tinsley    : 1990     MRN: [id number]     Chief Complaint  Groin Swelling (Vaginal mass )    Subjective     History of Present Illness:  Sayda Tinsley is a 35 y.o. female who presents today for vagina lump, around 2 inches big but no bleeding, vaginal discharge. No fever.   No hx of vaginal trauma  She is nervous today given new vaignal lesion, hence high BP. asymptomatic  Family History:   Family History   Problem Relation Age of Onset    Anxiety disorder Mother     Coronary artery disease Father     Hypertension Father     Diabetes Sister         Pre-Diabetic    Diabetes Maternal Grandfather     Cancer Maternal Grandfather     Melanoma Paternal Grandmother     Osteoporosis Paternal Grandmother     Hypertension Paternal Grandmother     Arthritis Paternal Grandmother        Social History:   Social History     Socioeconomic History    Marital status:      Spouse name: aman    Number of children: 1    Highest education level: High school graduate   Tobacco Use    Smoking status: Some Days     Current packs/day: 0.00     Average packs/day: 0.3 packs/day for 15.0 years (3.8 ttl pk-yrs)     Types: Cigarettes     Start date: 3/17/2008     Last attempt to quit: 3/17/2023     Years since quittin.3     Passive exposure: Past    Smokeless tobacco: Never    Tobacco comments:     Not sure on start date   Vaping Use    Vaping status: Never Used   Substance and Sexual Activity    Alcohol use: Yes     Comment: Only social events    Drug use: Never    Sexual activity: Yes     Partners: Male     Birth control/protection: None       Health Maintenance   Topic Date Due    Pneumococcal Vaccine 0-49 (1 of 2 - PCV) Never done    COVID-19 Vaccine ( - - season) 2026 (Originally 2024)    INFLUENZA VACCINE  10/01/2025    ANNUAL PHYSICAL  2026    PAP SMEAR  2028    TDAP/TD VACCINES (3 - Td or Tdap) 2033    HEPATITIS C SCREENING  Completed       Patient Care  "Team:  Shanna Holder MD as PCP - General (Family Medicine)    Objective     Vital Signs  BP (!) 162/104   Pulse 99   Temp 97.8 °F (36.6 °C) (Infrared)   Resp 16   Ht 165.1 cm (65\")   Wt 96.2 kg (212 lb)   SpO2 100%   BMI 35.28 kg/m²   Estimated body mass index is 35.28 kg/m² as calculated from the following:    Height as of this encounter: 165.1 cm (65\").    Weight as of this encounter: 96.2 kg (212 lb).        Physical Exam  Exam conducted with a chaperone present.   Genitourinary:         Comments: 1.5in round fluctuant cyst with more erythematous center, nontender, no discharge, no open wound         Procedures     Assessment and Plan     Diagnoses and all orders for this visit:    1. Bartholin cyst (Primary)  -     Cancel: Ambulatory Referral to Gynecology  -     sulfamethoxazole-trimethoprim (Bactrim DS) 800-160 MG per tablet; Take 1 tablet by mouth 2 (Two) Times a Day for 7 days.  Dispense: 14 tablet; Refill: 0  -     Ambulatory Referral to Gynecology     Hand out provided  Start bactrim as prescribed  Refer to gyn for possible I&D    Counseling was given to patient for the following topics: diagnostic results, instructions for management, impressions, and risks and benefits of treatment options.    Follow Up  No follow-ups on file.    Shanna Holder MD  [facility]  [address] [place] [id number] [phone]  "

## 2025-08-01 ENCOUNTER — OFFICE VISIT (OUTPATIENT)
Dept: OBSTETRICS AND GYNECOLOGY | Facility: CLINIC | Age: 35
End: 2025-08-01
Payer: COMMERCIAL

## 2025-08-01 VITALS
DIASTOLIC BLOOD PRESSURE: 78 MMHG | WEIGHT: 211 LBS | BODY MASS INDEX: 35.16 KG/M2 | HEIGHT: 65 IN | SYSTOLIC BLOOD PRESSURE: 112 MMHG

## 2025-08-01 DIAGNOSIS — N75.0 BARTHOLIN'S CYST: Primary | ICD-10-CM

## 2025-08-01 PROCEDURE — 99213 OFFICE O/P EST LOW 20 MIN: CPT | Performed by: MIDWIFE

## 2025-08-01 NOTE — PROGRESS NOTES
"Chief Complaint   Patient presents with    Bartholin's Cyst     Bartholin cyst      Sayda Tinsley is a 35 y.o. year old  presenting to be seen for followup of right Bartholin cyst.   She went to her PCP on  for swelling in right labia. She was placed on Bactrim x 5 days.  It has resolved. It never drained. She continues to have some minor discomfort. She hasn't been doing sitz baths.    History  Past Medical History:   Diagnosis Date    ADHD (attention deficit hyperactivity disorder)     Allergic     Penicillin    Anemia     Anxiety     Arthritis     Asthma     Hypertension     When pregnant    PMS (premenstrual syndrome)     Pregnancy 10/02/2023   , Allergies:  Penicillins    Social History    Tobacco Use      Smoking status: Some Days        Packs/day: 0.00        Years: 0.3 packs/day for 15.0 years (3.8 ttl pk-yrs)        Types: Cigarettes        Start date: 3/17/2008        Last attempt to quit: 3/17/2023        Years since quittin.3        Passive exposure: Past      Smokeless tobacco: Never      Tobacco comments: Not sure on start date      Review of Systems  Pertinent items are noted in HPI, all other systems reviewed and negative       Objective   /78   Ht 165.1 cm (65\")   Wt 95.7 kg (211 lb)   BMI 35.11 kg/m²     Physical Exam: Chaperone present   General Appearance: alert, appears stated age, and cooperative  Lungs: respirations regular, respirations even, and respirations unlabored  Abdomen: soft non-tender  Pelvic: Clinical staff was present for exam  External genitalia:  normal appearance of the external genitalia including Bartholin's and [place]'s glands. No swelling, redness, or tenderness of right labia  Extremities: moves extremities well, no edema, no cyanosis, and no redness  Skin: no bleeding, bruising or rash and no lesions noted  Neurologic: Mental Status orientated to person, place, time and situation, Speech normal content and execusion    Lab Review "   PAP    Imaging   No data reviewed         Assessment /Plan    Diagnoses and all orders for this visit:    1. Bartholin's cyst (Primary)  This has resolved. Advised she can do sitz baths if desired for comfort      No orders of the defined types were placed in this encounter.    Follow up PRN           This note was electronically signed.  Jodie Faulkner CNM  8/1/2025

## (undated) DEVICE — SUT VICRYL 3/0 CT1 27IN J258H

## (undated) DEVICE — SLV SCD CALF HEMOFORCE DVT THERP REPROC MD

## (undated) DEVICE — LARGE, DISPOSABLE ALEXIS O C-SECTION PROTECTOR - RETRACTOR: Brand: ALEXIS ® O C-SECTION PROTECTOR - RETRACTOR

## (undated) DEVICE — STERILE BABY BLANKET W/CSR: Brand: MEDLINE

## (undated) DEVICE — DRSNG SURG AQUACEL AG/ADVNTGE 9X25CM 3.5X10IN

## (undated) DEVICE — STRIP,CLOSURE,WOUND,MEDI-STRIP,1/2X4: Brand: MEDLINE

## (undated) DEVICE — GLV SURG BIOGEL M LTX PF 8

## (undated) DEVICE — RICH C-SECTION: Brand: MEDLINE INDUSTRIES, INC.

## (undated) DEVICE — SUT VIC 4/0 KS 27IN VCP662H

## (undated) DEVICE — SUT GUT CHRM 1 CTX 36IN 905H

## (undated) DEVICE — TBG PENCL TELESCP MEGADYNE SMOKE EVAC 10FT

## (undated) DEVICE — SUT PDS 0 CT 36IN DYED Z358T

## (undated) DEVICE — GOWN AURORA POLY REINF XL: Brand: MEDLINE

## (undated) DEVICE — ADHS SKIN PREMIERPRO EXOFIN TOPICAL HI/VISC .5ML

## (undated) DEVICE — SUT GUT CHRM 2/0 SH 27IN G123H

## (undated) DEVICE — SOL IRR NACL 0.9PCT BT 1000ML

## (undated) DEVICE — CUP EXTRCT VAC

## (undated) DEVICE — TOWEL,OR,DSP,ST,NATURAL,DLX,4/PK,20PK/CS: Brand: MEDLINE